# Patient Record
Sex: FEMALE | Race: WHITE | Employment: FULL TIME | URBAN - METROPOLITAN AREA
[De-identification: names, ages, dates, MRNs, and addresses within clinical notes are randomized per-mention and may not be internally consistent; named-entity substitution may affect disease eponyms.]

---

## 2019-05-08 ENCOUNTER — OFFICE VISIT (OUTPATIENT)
Dept: CARDIOLOGY CLINIC | Age: 64
End: 2019-05-08
Payer: COMMERCIAL

## 2019-05-08 VITALS
HEIGHT: 68 IN | BODY MASS INDEX: 27.25 KG/M2 | RESPIRATION RATE: 16 BRPM | SYSTOLIC BLOOD PRESSURE: 114 MMHG | WEIGHT: 179.8 LBS | DIASTOLIC BLOOD PRESSURE: 60 MMHG | HEART RATE: 81 BPM

## 2019-05-08 DIAGNOSIS — I10 HYPERTENSION, UNSPECIFIED TYPE: Primary | ICD-10-CM

## 2019-05-08 DIAGNOSIS — R07.2 PRECORDIAL PAIN: ICD-10-CM

## 2019-05-08 DIAGNOSIS — E78.00 PURE HYPERCHOLESTEROLEMIA: ICD-10-CM

## 2019-05-08 PROCEDURE — G8400 PT W/DXA NO RESULTS DOC: HCPCS | Performed by: INTERNAL MEDICINE

## 2019-05-08 PROCEDURE — 1090F PRES/ABSN URINE INCON ASSESS: CPT | Performed by: INTERNAL MEDICINE

## 2019-05-08 PROCEDURE — 4040F PNEUMOC VAC/ADMIN/RCVD: CPT | Performed by: INTERNAL MEDICINE

## 2019-05-08 PROCEDURE — G8419 CALC BMI OUT NRM PARAM NOF/U: HCPCS | Performed by: INTERNAL MEDICINE

## 2019-05-08 PROCEDURE — 93000 ELECTROCARDIOGRAM COMPLETE: CPT | Performed by: INTERNAL MEDICINE

## 2019-05-08 PROCEDURE — 3017F COLORECTAL CA SCREEN DOC REV: CPT | Performed by: INTERNAL MEDICINE

## 2019-05-08 PROCEDURE — 99204 OFFICE O/P NEW MOD 45 MIN: CPT | Performed by: INTERNAL MEDICINE

## 2019-05-08 PROCEDURE — 1123F ACP DISCUSS/DSCN MKR DOCD: CPT | Performed by: INTERNAL MEDICINE

## 2019-05-08 PROCEDURE — G8427 DOCREV CUR MEDS BY ELIG CLIN: HCPCS | Performed by: INTERNAL MEDICINE

## 2019-05-08 PROCEDURE — 1036F TOBACCO NON-USER: CPT | Performed by: INTERNAL MEDICINE

## 2019-05-08 RX ORDER — ROSUVASTATIN CALCIUM 10 MG/1
10 TABLET, COATED ORAL DAILY
COMMUNITY

## 2019-05-08 NOTE — PROGRESS NOTES
CHIEF COMPLAINT: Chest pain    HISTORY OF PRESENT ILLNESS: Patient is a 70 y.o. female seen at the request of Marivel Marroquin MD.      Patient complains of chest pain. Onset was several years ago, with worsening course since that time. The patient describes the pain as intermittent, occurring with increasing frequency, precordial and sharp in nature, radiates to the right neck/jaw and right shoulder. Patient rates pain as a 6/10 in intensity. Associated symptoms are chest pain. Aggravating factors are none. Alleviating factors are: none. Patient's cardiac risk factors are advanced age (older than 54 for men, 72 for women), dyslipidemia, family history of premature cardiovascular disease and sedentary lifestyle. Past Medical History:   Diagnosis Date    Hyperlipemia        Patient Active Problem List   Diagnosis    Pigmented skin lesions    SK (seborrheic keratosis)    Hyperlipemia       No Known Allergies    Current Outpatient Medications   Medication Sig Dispense Refill    rosuvastatin (CRESTOR) 10 MG tablet Take 10 mg by mouth daily      Naproxen Sodium (ALEVE PO) Take  by mouth as needed.       cyclobenzaprine (FLEXERIL) 10 MG tablet Take 10 mg by mouth 3 times daily as needed for Muscle spasms        Current Facility-Administered Medications   Medication Dose Route Frequency Provider Last Rate Last Dose    lidocaine-EPINEPHrine 1 %[id number] injection 20 mL  20 mL Intradermal Once Alejandra Steele MD           Social History     Socioeconomic History    Marital status:      Spouse name: Not on file    Number of children: Not on file    Years of education: Not on file    Highest education level: Not on file   Occupational History    Not on file   Social Needs    Financial resource strain: Not on file    Food insecurity:     Worry: Not on file     Inability: Not on file    Transportation needs:     Medical: Not on file     Non-medical: Not on file   Tobacco Use    Smoking status: Never Smoker    Smokeless tobacco: Never Used   Substance and Sexual Activity    Alcohol use: No    Drug use: No    Sexual activity: Not on file   Lifestyle    Physical activity:     Days per week: Not on file     Minutes per session: Not on file    Stress: Not on file   Relationships    Social connections:     Talks on phone: Not on file     Gets together: Not on file     Attends [employer] service: Not on file     Active member of club or organization: Not on file     Attends meetings of clubs or organizations: Not on file     Relationship status: Not on file    Intimate partner violence:     Fear of current or ex partner: Not on file     Emotionally abused: Not on file     Physically abused: Not on file     Forced sexual activity: Not on file   Other Topics Concern    Not on file   Social History Narrative    Not on file       Family History   Problem Relation Age of Onset    High Blood Pressure Mother     Diabetes Father     High Cholesterol Father        Review of Systems:  Heart: as above   Lungs: as above   Eyes: denies changes in vision or discharge. Ears: denies changes in hearing or pain. Nose: denies epistaxis or masses   Throat: denies sore throat or trouble swallowing. Neuro: denies numbness, tingling, tremors. Skin: denies rashes or itching. : denies hematuria, dysuria   GI: denies vomiting, diarrhea   Psych: denies mood changed, anxiety, depression. All other systems negative. Physical Exam   /60   Pulse 81   Resp 16   Ht 5' 8\" (1.727 m)   Wt 179 lb 12.8 oz (81.6 kg)   BMI 27.34 kg/m²   Constitutional: Oriented to person, place, and time. Well-developed and well-nourished. No distress. Head: Normocephalic and atraumatic. Eyes: EOM are normal. Pupils are equal, round, and reactive to light. Neck: Normal range of motion. Neck supple. No hepatojugular reflux and no JVD present. Carotid bruit is not present. No tracheal deviation present.  No thyromegaly present. Cardiovascular: Normal rate, regular rhythm, normal heart sounds and intact distal pulses. Exam reveals no gallop and no friction rub. No murmur heard. Pulmonary/Chest: Effort normal and breath sounds normal. No respiratory distress. No wheezes. No rales. No tenderness. Abdominal: Soft. Bowel sounds are normal. No distension and no mass. No tenderness. No rebound and no guarding. Musculoskeletal: Normal range of motion. No edema and no tenderness. Lymphadenopathy:   No cervical adenopathy. No groin adenopathy. Neurological: Alert and oriented to person, place, and time. Skin: Skin is warm and dry. No rash noted. Not diaphoretic. No erythema. Psychiatric: Normal mood and affect. Behavior is normal.     EKG:  normal sinus rhythm, nonspecific ST and T waves changes. ASSESSMENT AND PLAN:  Patient Active Problem List   Diagnosis    Pigmented skin lesions    SK (seborrheic keratosis)    Hyperlipemia     1. Chest pain: EKG no acute changes. Stress to evaluate. Carrie Ren D.O.   Cardiologist  Cardiology, [facility]

## 2019-05-10 ENCOUNTER — TELEPHONE (OUTPATIENT)
Dept: CARDIOLOGY CLINIC | Age: 64
End: 2019-05-10

## 2019-05-10 ENCOUNTER — HOSPITAL ENCOUNTER (OUTPATIENT)
Dept: CARDIOLOGY | Age: 64
Discharge: HOME | End: 2019-05-10
Payer: COMMERCIAL

## 2019-05-10 VITALS
BODY MASS INDEX: 27.13 KG/M2 | DIASTOLIC BLOOD PRESSURE: 70 MMHG | HEIGHT: 68 IN | SYSTOLIC BLOOD PRESSURE: 128 MMHG | WEIGHT: 179 LBS | HEART RATE: 102 BPM

## 2019-05-10 DIAGNOSIS — R07.2 PRECORDIAL PAIN: ICD-10-CM

## 2019-05-10 PROCEDURE — 2580000003 HC RX 258: Performed by: INTERNAL MEDICINE

## 2019-05-10 PROCEDURE — 78452 HT MUSCLE IMAGE SPECT MULT: CPT

## 2019-05-10 PROCEDURE — 3430000000 HC RX DIAGNOSTIC RADIOPHARMACEUTICAL: Performed by: INTERNAL MEDICINE

## 2019-05-10 PROCEDURE — A9500 TC99M SESTAMIBI: HCPCS | Performed by: INTERNAL MEDICINE

## 2019-05-10 PROCEDURE — 93017 CV STRESS TEST TRACING ONLY: CPT

## 2019-05-10 RX ORDER — SODIUM CHLORIDE 0.9 % (FLUSH) 0.9 %
10 SYRINGE (ML) INJECTION PRN
Status: DISCONTINUED | OUTPATIENT
Start: 2019-05-10 | End: 2019-05-11 | Stop reason: HOSPADM

## 2019-05-10 RX ADMIN — Medication 10.7 MILLICURIE: at 07:22

## 2019-05-10 RX ADMIN — Medication 10 ML: at 07:22

## 2019-05-10 RX ADMIN — Medication 32.8 MILLICURIE: at 08:44

## 2019-05-10 RX ADMIN — Medication 10 ML: at 08:45

## 2019-05-10 NOTE — PROCEDURES
[address] [facility] [address] [address] [address]  [phone]                Exercise Stress Nuclear Gated SPECT Study    Name: [facility] Account Number: [de-identified]    :  1955      Sex: female              Date of Study:  5/10/2019    Height: 5' 8\" (172.7 cm)  Weight: 179 lb (81.2 kg)     Ordering Provider: Luiza Sauceda DO          PCP: Paige Manuel MD      Cardiologist: Luiza Sauceda DO                        Interpreting Physician: Alona Lewis MD  _________________________________________________________________________________    Indication:   Detecting the presence and location of coronary artery disease    Clinical History:   Patient has no known history of coronary artery disease. Resting ECG:    HR 89 bpm  Normal sinus rhythm    Exercise: The patient exercised using a Stephan protocol, completing 4:56 minutes and reaching an estimated work load of 7.0 metabolic equivalents (METS). Resting HR was 89. Peak exercise heart rate was 135 ( 87% of maximum predicted heart rate for age). Baseline /70. Peak exercise /80. The blood pressure response to exercise was normal      Exercise was terminated due to heart rate attained, bilateral knee pain, patient request.    The patient experienced no chest pain with exercise. Exercise ECG:   The patient demonstrated no arrhythmias during exercise. With exercise, there were no ST segment changes of significance at the heart rate achieved. Humphrey treadmill score was 5 implying low risk. IMAGING: Myocardial perfusion imaging was performed at rest 30-35 minutes following the intravenous injection of 10.7 mCi of (Tc-Sestamibi) followed by 10 ml of Normal Saline. At peak exercise, the patient was injected intravenously with 32.8 mCi of (Tc-Sestamibi) followed by 10 ml of Normal Saline.   Gated post-stress tomographic imaging was performed - minutes after stress. FINDINGS: The overall quality of the study was excellent. Left ventricular cavity size was noted to be normal.    Rotational analog analysis demonstrated no patient motion. The gated SPECT stress imaging in the short, vertical long, and horizontal long axis demonstrated normal homogeneous tracer distribution throughout the myocardium. The resting images show no change. Gated SPECT left ventricular ejection fraction was calculated to be 94%, with normal myocardial thickening and wall motion. Impression:    1. Exercise EKG was negative. 2. The patient experienced no chest pain with exercise. 3. The myocardial perfusion imaging was normal.  4. Overall left ventricular systolic function was normal without regional wall motion abnormalities. 5. Humphrey treadmill score was 5 implying low risk. 6. Exercise capacity was average. 7. Low risk general exercise treadmill test.    Thank you for sending your patient to this [employer].      Electronically signed by Rell Camacho MD on 5/10/19 at 1:39 PM

## 2024-03-30 ENCOUNTER — HOSPITAL ENCOUNTER (INPATIENT)
Age: 69
LOS: 1 days | Discharge: HOME | DRG: 057 | End: 2024-04-01
Attending: EMERGENCY MEDICINE | Admitting: STUDENT IN AN ORGANIZED HEALTH CARE EDUCATION/TRAINING PROGRAM
Payer: MEDICARE

## 2024-03-30 ENCOUNTER — APPOINTMENT (OUTPATIENT)
Dept: GENERAL RADIOLOGY | Age: 69
DRG: 057 | End: 2024-03-30
Payer: MEDICARE

## 2024-03-30 ENCOUNTER — APPOINTMENT (OUTPATIENT)
Dept: CT IMAGING | Age: 69
DRG: 057 | End: 2024-03-30
Payer: MEDICARE

## 2024-03-30 DIAGNOSIS — G91.2 NORMAL PRESSURE HYDROCEPHALUS (HCC): Primary | ICD-10-CM

## 2024-03-30 LAB
ALBUMIN SERPL-MCNC: 4.4 G/DL (ref 3.5–5.2)
ALP SERPL-CCNC: 95 U/L (ref 35–104)
ALT SERPL-CCNC: 21 U/L (ref 0–32)
ANION GAP SERPL CALCULATED.3IONS-SCNC: 13 MMOL/L (ref 7–16)
AST SERPL-CCNC: 19 U/L (ref 0–31)
BASOPHILS # BLD: 0.08 K/UL (ref 0–0.2)
BASOPHILS NFR BLD: 1 % (ref 0–2)
BILIRUB SERPL-MCNC: 0.3 MG/DL (ref 0–1.2)
BUN SERPL-MCNC: 15 MG/DL (ref 6–23)
CALCIUM SERPL-MCNC: 9.5 MG/DL (ref 8.6–10.2)
CHLORIDE SERPL-SCNC: 102 MMOL/L (ref 98–107)
CO2 SERPL-SCNC: 25 MMOL/L (ref 22–29)
CREAT SERPL-MCNC: 0.6 MG/DL (ref 0.5–1)
EOSINOPHIL # BLD: 0.25 K/UL (ref 0.05–0.5)
EOSINOPHILS RELATIVE PERCENT: 3 % (ref 0–6)
ERYTHROCYTE [DISTWIDTH] IN BLOOD BY AUTOMATED COUNT: 13.2 % (ref 11.5–15)
GFR SERPL CREATININE-BSD FRML MDRD: >90 ML/MIN/1.73M2
GLUCOSE BLD-MCNC: 122 MG/DL (ref 74–99)
GLUCOSE SERPL-MCNC: 145 MG/DL (ref 74–99)
HCT VFR BLD AUTO: 45.2 % (ref 34–48)
HGB BLD-MCNC: 15.1 G/DL (ref 11.5–15.5)
IMM GRANULOCYTES # BLD AUTO: 0.09 K/UL (ref 0–0.58)
IMM GRANULOCYTES NFR BLD: 1 % (ref 0–5)
LYMPHOCYTES NFR BLD: 3.81 K/UL (ref 1.5–4)
LYMPHOCYTES RELATIVE PERCENT: 38 % (ref 20–42)
MCH RBC QN AUTO: 30.7 PG (ref 26–35)
MCHC RBC AUTO-ENTMCNC: 33.4 G/DL (ref 32–34.5)
MCV RBC AUTO: 91.9 FL (ref 80–99.9)
MONOCYTES NFR BLD: 0.47 K/UL (ref 0.1–0.95)
MONOCYTES NFR BLD: 5 % (ref 2–12)
NEUTROPHILS NFR BLD: 53 % (ref 43–80)
NEUTS SEG NFR BLD: 5.34 K/UL (ref 1.8–7.3)
PLATELET # BLD AUTO: 245 K/UL (ref 130–450)
PMV BLD AUTO: 10.4 FL (ref 7–12)
POTASSIUM SERPL-SCNC: 3.6 MMOL/L (ref 3.5–5)
PROT SERPL-MCNC: 7.8 G/DL (ref 6.4–8.3)
RBC # BLD AUTO: 4.92 M/UL (ref 3.5–5.5)
SODIUM SERPL-SCNC: 140 MMOL/L (ref 132–146)
TROPONIN I SERPL HS-MCNC: 7 NG/L (ref 0–9)
WBC OTHER # BLD: 10 K/UL (ref 4.5–11.5)

## 2024-03-30 PROCEDURE — 72125 CT NECK SPINE W/O DYE: CPT

## 2024-03-30 PROCEDURE — 93005 ELECTROCARDIOGRAM TRACING: CPT

## 2024-03-30 PROCEDURE — 99285 EMERGENCY DEPT VISIT HI MDM: CPT

## 2024-03-30 PROCEDURE — 96374 THER/PROPH/DIAG INJ IV PUSH: CPT

## 2024-03-30 PROCEDURE — 70450 CT HEAD/BRAIN W/O DYE: CPT

## 2024-03-30 PROCEDURE — 84484 ASSAY OF TROPONIN QUANT: CPT

## 2024-03-30 PROCEDURE — 80053 COMPREHEN METABOLIC PANEL: CPT

## 2024-03-30 PROCEDURE — 71045 X-RAY EXAM CHEST 1 VIEW: CPT

## 2024-03-30 PROCEDURE — 85025 COMPLETE CBC W/AUTO DIFF WBC: CPT

## 2024-03-30 PROCEDURE — 6360000002 HC RX W HCPCS: Performed by: EMERGENCY MEDICINE

## 2024-03-30 PROCEDURE — 82962 GLUCOSE BLOOD TEST: CPT

## 2024-03-30 RX ORDER — ONDANSETRON 2 MG/ML
4 INJECTION INTRAMUSCULAR; INTRAVENOUS ONCE
Status: COMPLETED | OUTPATIENT
Start: 2024-03-30 | End: 2024-03-30

## 2024-03-30 RX ADMIN — ONDANSETRON HYDROCHLORIDE 4 MG: 2 INJECTION, SOLUTION INTRAMUSCULAR; INTRAVENOUS at 19:52

## 2024-03-30 ASSESSMENT — PAIN - FUNCTIONAL ASSESSMENT: PAIN_FUNCTIONAL_ASSESSMENT: NONE - DENIES PAIN

## 2024-03-30 ASSESSMENT — LIFESTYLE VARIABLES
HOW OFTEN DO YOU HAVE A DRINK CONTAINING ALCOHOL: NEVER
HOW OFTEN DO YOU HAVE A DRINK CONTAINING ALCOHOL: NEVER
HOW MANY STANDARD DRINKS CONTAINING ALCOHOL DO YOU HAVE ON A TYPICAL DAY: PATIENT DOES NOT DRINK

## 2024-03-31 PROBLEM — G91.2 NORMAL PRESSURE HYDROCEPHALUS (HCC): Status: ACTIVE | Noted: 2024-03-31

## 2024-03-31 LAB
25(OH)D3 SERPL-MCNC: 16.5 NG/ML (ref 30–100)
ALBUMIN SERPL-MCNC: 4.2 G/DL (ref 3.5–5.2)
ALP SERPL-CCNC: 81 U/L (ref 35–104)
ALT SERPL-CCNC: 19 U/L (ref 0–32)
AMMONIA PLAS-SCNC: 11 UMOL/L (ref 11–51)
AMPHET UR QL SCN: NEGATIVE
ANION GAP SERPL CALCULATED.3IONS-SCNC: 16 MMOL/L (ref 7–16)
AST SERPL-CCNC: 18 U/L (ref 0–31)
B PARAP IS1001 DNA NPH QL NAA+NON-PROBE: NOT DETECTED
B PERT DNA SPEC QL NAA+PROBE: NOT DETECTED
BARBITURATES UR QL SCN: NEGATIVE
BENZODIAZ UR QL: NEGATIVE
BILIRUB SERPL-MCNC: 0.4 MG/DL (ref 0–1.2)
BUN SERPL-MCNC: 14 MG/DL (ref 6–23)
BUPRENORPHINE UR QL: NEGATIVE
C PNEUM DNA NPH QL NAA+NON-PROBE: NOT DETECTED
CALCIUM SERPL-MCNC: 9.6 MG/DL (ref 8.6–10.2)
CANNABINOIDS UR QL SCN: NEGATIVE
CHLORIDE SERPL-SCNC: 102 MMOL/L (ref 98–107)
CO2 SERPL-SCNC: 24 MMOL/L (ref 22–29)
COCAINE UR QL SCN: NEGATIVE
CREAT SERPL-MCNC: 0.6 MG/DL (ref 0.5–1)
CRP SERPL HS-MCNC: <3 MG/L (ref 0–5)
ERYTHROCYTE [SEDIMENTATION RATE] IN BLOOD BY WESTERGREN METHOD: 23 MM/HR (ref 0–20)
FENTANYL UR QL: NEGATIVE
FLUAV RNA NPH QL NAA+NON-PROBE: NOT DETECTED
FLUBV RNA NPH QL NAA+NON-PROBE: NOT DETECTED
FOLATE SERPL-MCNC: 8.9 NG/ML (ref 4.8–24.2)
GFR SERPL CREATININE-BSD FRML MDRD: >90 ML/MIN/1.73M2
GLUCOSE SERPL-MCNC: 155 MG/DL (ref 74–99)
HADV DNA NPH QL NAA+NON-PROBE: NOT DETECTED
HCOV 229E RNA NPH QL NAA+NON-PROBE: NOT DETECTED
HCOV HKU1 RNA NPH QL NAA+NON-PROBE: NOT DETECTED
HCOV NL63 RNA NPH QL NAA+NON-PROBE: NOT DETECTED
HCOV OC43 RNA NPH QL NAA+NON-PROBE: NOT DETECTED
HMPV RNA NPH QL NAA+NON-PROBE: NOT DETECTED
HPIV1 RNA NPH QL NAA+NON-PROBE: NOT DETECTED
HPIV2 RNA NPH QL NAA+NON-PROBE: NOT DETECTED
HPIV3 RNA NPH QL NAA+NON-PROBE: NOT DETECTED
HPIV4 RNA NPH QL NAA+NON-PROBE: NOT DETECTED
M PNEUMO DNA NPH QL NAA+NON-PROBE: NOT DETECTED
METHADONE UR QL: NEGATIVE
OPIATES UR QL SCN: NEGATIVE
OXYCODONE UR QL SCN: NEGATIVE
PCP UR QL SCN: NEGATIVE
POTASSIUM SERPL-SCNC: 3.8 MMOL/L (ref 3.5–5)
PROT SERPL-MCNC: 7.3 G/DL (ref 6.4–8.3)
RSV RNA NPH QL NAA+NON-PROBE: NOT DETECTED
RV+EV RNA NPH QL NAA+NON-PROBE: NOT DETECTED
SARS-COV-2 RNA NPH QL NAA+NON-PROBE: NOT DETECTED
SODIUM SERPL-SCNC: 142 MMOL/L (ref 132–146)
SPECIMEN DESCRIPTION: NORMAL
TEST INFORMATION: NORMAL
TSH SERPL DL<=0.05 MIU/L-ACNC: 2.54 UIU/ML (ref 0.27–4.2)
VIT B12 SERPL-MCNC: 596 PG/ML (ref 211–946)

## 2024-03-31 PROCEDURE — 80307 DRUG TEST PRSMV CHEM ANLYZR: CPT

## 2024-03-31 PROCEDURE — 82607 VITAMIN B-12: CPT

## 2024-03-31 PROCEDURE — 0202U NFCT DS 22 TRGT SARS-COV-2: CPT

## 2024-03-31 PROCEDURE — 85652 RBC SED RATE AUTOMATED: CPT

## 2024-03-31 PROCEDURE — 6370000000 HC RX 637 (ALT 250 FOR IP): Performed by: NURSE PRACTITIONER

## 2024-03-31 PROCEDURE — 82746 ASSAY OF FOLIC ACID SERUM: CPT

## 2024-03-31 PROCEDURE — 80053 COMPREHEN METABOLIC PANEL: CPT

## 2024-03-31 PROCEDURE — 82140 ASSAY OF AMMONIA: CPT

## 2024-03-31 PROCEDURE — 99222 1ST HOSP IP/OBS MODERATE 55: CPT | Performed by: PSYCHIATRY & NEUROLOGY

## 2024-03-31 PROCEDURE — 84443 ASSAY THYROID STIM HORMONE: CPT

## 2024-03-31 PROCEDURE — 2580000003 HC RX 258: Performed by: NURSE PRACTITIONER

## 2024-03-31 PROCEDURE — 86140 C-REACTIVE PROTEIN: CPT

## 2024-03-31 PROCEDURE — 1200000000 HC SEMI PRIVATE

## 2024-03-31 PROCEDURE — 36415 COLL VENOUS BLD VENIPUNCTURE: CPT

## 2024-03-31 PROCEDURE — 2580000003 HC RX 258: Performed by: STUDENT IN AN ORGANIZED HEALTH CARE EDUCATION/TRAINING PROGRAM

## 2024-03-31 PROCEDURE — 82306 VITAMIN D 25 HYDROXY: CPT

## 2024-03-31 RX ORDER — SODIUM CHLORIDE 9 MG/ML
INJECTION, SOLUTION INTRAVENOUS PRN
Status: DISCONTINUED | OUTPATIENT
Start: 2024-03-31 | End: 2024-04-01 | Stop reason: HOSPADM

## 2024-03-31 RX ORDER — ENOXAPARIN SODIUM 100 MG/ML
40 INJECTION SUBCUTANEOUS DAILY
Status: DISCONTINUED | OUTPATIENT
Start: 2024-03-31 | End: 2024-04-01 | Stop reason: HOSPADM

## 2024-03-31 RX ORDER — ROSUVASTATIN CALCIUM 10 MG/1
10 TABLET, COATED ORAL DAILY
Status: DISCONTINUED | OUTPATIENT
Start: 2024-03-31 | End: 2024-04-01 | Stop reason: HOSPADM

## 2024-03-31 RX ORDER — ONDANSETRON 2 MG/ML
4 INJECTION INTRAMUSCULAR; INTRAVENOUS EVERY 6 HOURS PRN
Status: DISCONTINUED | OUTPATIENT
Start: 2024-03-31 | End: 2024-04-01 | Stop reason: HOSPADM

## 2024-03-31 RX ORDER — POTASSIUM CHLORIDE 20 MEQ/1
40 TABLET, EXTENDED RELEASE ORAL PRN
Status: DISCONTINUED | OUTPATIENT
Start: 2024-03-31 | End: 2024-04-01 | Stop reason: HOSPADM

## 2024-03-31 RX ORDER — ACETAMINOPHEN 325 MG/1
650 TABLET ORAL EVERY 6 HOURS PRN
Status: DISCONTINUED | OUTPATIENT
Start: 2024-03-31 | End: 2024-04-01 | Stop reason: HOSPADM

## 2024-03-31 RX ORDER — SODIUM CHLORIDE 0.9 % (FLUSH) 0.9 %
10 SYRINGE (ML) INJECTION PRN
Status: DISCONTINUED | OUTPATIENT
Start: 2024-03-31 | End: 2024-04-01 | Stop reason: HOSPADM

## 2024-03-31 RX ORDER — SODIUM CHLORIDE, SODIUM LACTATE, POTASSIUM CHLORIDE, CALCIUM CHLORIDE 600; 310; 30; 20 MG/100ML; MG/100ML; MG/100ML; MG/100ML
INJECTION, SOLUTION INTRAVENOUS CONTINUOUS
Status: ACTIVE | OUTPATIENT
Start: 2024-03-31 | End: 2024-04-01

## 2024-03-31 RX ORDER — ONDANSETRON 4 MG/1
4 TABLET, ORALLY DISINTEGRATING ORAL EVERY 8 HOURS PRN
Status: DISCONTINUED | OUTPATIENT
Start: 2024-03-31 | End: 2024-04-01 | Stop reason: HOSPADM

## 2024-03-31 RX ORDER — MAGNESIUM SULFATE IN WATER 40 MG/ML
2000 INJECTION, SOLUTION INTRAVENOUS PRN
Status: DISCONTINUED | OUTPATIENT
Start: 2024-03-31 | End: 2024-04-01 | Stop reason: HOSPADM

## 2024-03-31 RX ORDER — POTASSIUM CHLORIDE 7.45 MG/ML
10 INJECTION INTRAVENOUS PRN
Status: DISCONTINUED | OUTPATIENT
Start: 2024-03-31 | End: 2024-04-01 | Stop reason: HOSPADM

## 2024-03-31 RX ORDER — CYCLOBENZAPRINE HCL 10 MG
10 TABLET ORAL PRN
Status: DISCONTINUED | OUTPATIENT
Start: 2024-03-31 | End: 2024-04-01 | Stop reason: HOSPADM

## 2024-03-31 RX ORDER — SENNOSIDES A AND B 8.6 MG/1
1 TABLET, FILM COATED ORAL DAILY PRN
Status: DISCONTINUED | OUTPATIENT
Start: 2024-03-31 | End: 2024-04-01 | Stop reason: HOSPADM

## 2024-03-31 RX ORDER — ACETAMINOPHEN 650 MG/1
650 SUPPOSITORY RECTAL EVERY 6 HOURS PRN
Status: DISCONTINUED | OUTPATIENT
Start: 2024-03-31 | End: 2024-04-01 | Stop reason: HOSPADM

## 2024-03-31 RX ORDER — SODIUM CHLORIDE 0.9 % (FLUSH) 0.9 %
10 SYRINGE (ML) INJECTION EVERY 12 HOURS SCHEDULED
Status: DISCONTINUED | OUTPATIENT
Start: 2024-03-31 | End: 2024-04-01 | Stop reason: HOSPADM

## 2024-03-31 RX ADMIN — SODIUM CHLORIDE, POTASSIUM CHLORIDE, SODIUM LACTATE AND CALCIUM CHLORIDE: 600; 310; 30; 20 INJECTION, SOLUTION INTRAVENOUS at 09:59

## 2024-03-31 RX ADMIN — SODIUM CHLORIDE, PRESERVATIVE FREE 10 ML: 5 INJECTION INTRAVENOUS at 21:07

## 2024-03-31 RX ADMIN — ACETAMINOPHEN 650 MG: 325 TABLET ORAL at 18:31

## 2024-03-31 RX ADMIN — ROSUVASTATIN 10 MG: 10 TABLET, FILM COATED ORAL at 08:37

## 2024-03-31 RX ADMIN — SODIUM CHLORIDE, PRESERVATIVE FREE 10 ML: 5 INJECTION INTRAVENOUS at 08:36

## 2024-03-31 ASSESSMENT — PAIN DESCRIPTION - LOCATION: LOCATION: HEAD;NECK

## 2024-03-31 ASSESSMENT — PAIN SCALES - GENERAL: PAINLEVEL_OUTOF10: 5

## 2024-03-31 ASSESSMENT — PAIN DESCRIPTION - DESCRIPTORS: DESCRIPTORS: ACHING

## 2024-03-31 ASSESSMENT — PAIN DESCRIPTION - ORIENTATION: ORIENTATION: MID

## 2024-03-31 NOTE — PROGRESS NOTES
4 Eyes Skin Assessment     NAME:  Emili Esquivel  YOB: 1955  MEDICAL RECORD NUMBER:  [id number]    The patient is being assessed for  Admission    I agree that at least one RN has performed a thorough Head to Toe Skin Assessment on the patient. ALL assessment sites listed below have been assessed.      Areas assessed by both nurses:    Head, Face, Ears, Shoulders, Back, Chest, Arms, Elbows, Hands, Sacrum. Buttock, Coccyx, Ischium, and Legs. Feet and Heels        Does the Patient have a Wound? No noted wound(s)       Guillermo Prevention initiated by RN: No  Wound Care Orders initiated by RN: No    Pressure Injury (Stage 3,4, Unstageable, DTI, NWPT, and Complex wounds) if present, place Wound referral order by RN under : No    New Ostomies, if present place, Ostomy referral order under : No     Nurse 1 eSignature: Electronically signed by Juana Roberson RN on 3/31/24 at 6:15 AM EDT    **SHARE this note so that the co-signing nurse can place an eSignature**    Nurse 2 eSignature: {Esignature:[id number]}

## 2024-03-31 NOTE — ED PROVIDER NOTES
ordered.  ECG/medicine tests: ordered.    Risk  Prescription drug management.  Decision regarding hospitalization.        Patient is a 68-year-old female presenting with vomiting at the time initial evaluation, the patient is hemodynamically stable.  Differential diagnosis includes acute coronary syndrome, hypoglycemia, intracranial hemorrhage, pneumonia, pneumothorax to name a few.    Patient was medicated with Zofran in the ER.    EKG ordered to evaluate patient's current cardiac rate, rhythm, and QT interval. POCT glucose ordered to rule out acute hyperglycemia or hypoglycemia. CBC is ordered to evaluate for any signs of infection or inflammation by obtaining a WBC count, or any signs of acute anemia by interpreting hemoglobin. CMP for any electrolyte imbalances, kidney function, hepatic injury or any elevations in anion gap. Chest x-ray for any possible signs of, but without limitation to, pneumonia, pleural effusions, cardiomegaly, pneumothorax, atelectasis, rib or sternal abnormalities including fractures. CT head and cervical spine for hemorrhages, fractures, subluxation or displacement.      Point-of-care glucose is 122.  CBC does not suggest acute pathology.  Troponin is initially stable.  CMP shows glucose of 145 with otherwise all normal electrolytes, kidney function and liver function.  EKG does not suggest acute skin pathology.  CT head shows evidence of normal pressure hydrocephalus.  CT cervical spine is absent of acute pathology.  Chest x-ray shows no acute pathology.    On reevaluation, patient notes improvement of her nausea but states that her symptoms returned when she attempts to sit up or ambulate.  She denies any room spinning or vertiginous symptoms.  Her neuroexam continues to remain benign.  Patient does report that she had an outpatient CT scan which did show evidence of normal pressure hydrocephalus and that her PCP recommended that she follow-up with a neurosurgeon which she has not had

## 2024-03-31 NOTE — PLAN OF CARE
Problem: Safety - Adult  Goal: Free from fall injury  3/31/2024 1036 by Carolee Mohamud, RN  Outcome: Progressing

## 2024-03-31 NOTE — H&P
Internal Medicine History & Physical     Name: Emili Esquivel  : 1955  Chief Complaint: Emesis (Per pt feeling nausea since this am.  1 episode of yellow emesis while in ER with family.  PT states \"I just don't feel well.\")  Primary Care Physician: Pa Burdick MD  Admission date: 3/30/2024  Date of service: 3/31/2024     History of Present Illness  Emili is a 68 y.o. year old female presented with a chief complaint of emesis      Took her  to the ED down stairs last night, she states she was feeling nauseas, fell or possibly sat on the floor but did not have LOC or dizziness     1 week ago fell and hit her head on a truck after coming home from previous work a banquet her yard is on a hill she started walking up and lost her balance it was slippery and muddy and she fell back against the truck     Prior to this event she was walking well without issues     Since this event she has been feeling off balance walking at all times, getting up quick makes it worse, nothing makes it better.     She has been having urinary incontinence for 1 year     She has been having worsening confusion she thinks     Denies fevers chills or sweats    Denies alcohol drugs or cigarettes     She is retired     Past Medical History:   Diagnosis Date    Arthritis     Cancer (HCC)     skin     Hyperlipemia        Past Surgical History:   Procedure Laterality Date    APPENDECTOMY      COLONOSCOPY      HYSTERECTOMY         Family History   Problem Relation Age of Onset    High Blood Pressure Mother     Heart Attack Mother         age 70's    Diabetes Father     High Cholesterol Father     Other Father         peripheral artery disease          Social History  Patient lives at home.   At baseline patient ambulates with out assistance   Illicit drugs: Denies   TOBACCO:   reports that she has never smoked. She has never used smokeless tobacco.  ETOH:   reports no history of alcohol use.    Home Medications  Prior to

## 2024-03-31 NOTE — CARE COORDINATION
Internal Medicine On-call Care Coordination Note    I was called by the ED physician because they recommended admission for this patient and we cover their PCP.  The history as I understand it after discussion with the ED physician is as follows:    Presents with nausea and vomiting  Recent fall into truck that left a dent  Imaging suspicious for normal pressure hydrocephalus, moderate white matter changes    I placed admission orders.  Including:    Neurology consult  Clear liquid diet    Dr. Randle, or our coverage will see the patient for H&P.    Electronically signed by FAISAL Rodriguez CNP on 3/31/2024 at 5:14 AM

## 2024-03-31 NOTE — ED NOTES
Patient ambulated independently to restroom and back to room without assistance. Gait was quick and steady with no seeming problems with motion.

## 2024-04-01 VITALS
TEMPERATURE: 97.5 F | WEIGHT: 165 LBS | HEIGHT: 68 IN | SYSTOLIC BLOOD PRESSURE: 120 MMHG | HEART RATE: 89 BPM | RESPIRATION RATE: 16 BRPM | OXYGEN SATURATION: 98 % | BODY MASS INDEX: 25.01 KG/M2 | DIASTOLIC BLOOD PRESSURE: 70 MMHG

## 2024-04-01 PROCEDURE — 2580000003 HC RX 258: Performed by: NURSE PRACTITIONER

## 2024-04-01 PROCEDURE — 99222 1ST HOSP IP/OBS MODERATE 55: CPT | Performed by: NEUROLOGICAL SURGERY

## 2024-04-01 RX ADMIN — SODIUM CHLORIDE, PRESERVATIVE FREE 10 ML: 5 INJECTION INTRAVENOUS at 08:31

## 2024-04-01 NOTE — CONSULTS
NEUROLOGY CONSULT NOTE      Requesting Physician:  Aicha Gaspar APRN - CNP    Reason for Consult:  Evaluate for normal pressure hydrocephalus.    History of Present Illness:  Emili Esquivel is a 68 y.o. female  with h/o HLD, skin cancer, urinary incontinence x 1 year and arthritis  who was admitted to [facility] on 3/30/2024 with presentation of vomiting.  She was visiting her  in the hospital and presented to the ED because of nausea and vomiting.  Earlier, she apparently had a syncopal episode and fell into the side of her truck hitting her head and causing a significant dent in the truck.  She had no recall of the event and had to be helped into her home by her .  Patient's next recall was sitting in her house after regaining awareness.  No report of any specific precipitating event or factors.  She did report having problems with intermittent confusion that she feels is getting worse.  No history of similar events in the past.  There is no report of any associated neurologic deficits or prior history of transient neurologic deficits.  Patient did have a CT scan of the brain done 3/22/2024 that did not show any acute intracranial abnormalities.  There was note of a moderate disproportionate prominence of the ventricular system that was read as typical for communicating hydrocephalus/normal-pressure hydrocephalus.  NIHSS was 0 on evaluation in the ED with labs that were unremarkable.  A CT scan of the head was done on ED evaluation showing moderate ventriculomegaly suspicious for NPH along with moderate periventricular white matter changes and global parenchymal volume loss.  CT of the cervical spine did not show any acute intracranial abnormalities.  Patient continued to have nausea in ED with notes significant worsening whenever she attempted to sit up or ambulate.  There was no report of any associated vertigo.  No report of any focality on neurologic exam in the ED. States that she

## 2024-04-01 NOTE — CARE COORDINATION
Social Work/Case Management Transition of Care Planning (Vaishnavi Lam -[phone]):  Patient discharged prior to being seen by SW.  Per nursing, patient will follow up as an outpatient.  Therapy screened the patient and indicated patient is independent with all aspects.  Patient reportedly drove herself home.  Vaishnavi Lam, CHAYA  4/1/2024

## 2024-04-01 NOTE — CONSULTS
[facility]              [address] [place] [address]                              CONSULTATION      PATIENT NAME: HARIS VERGARA                 : 1955  MED REC NO: [id number]                        ROOM: [id number]  ACCOUNT NO: [id number]                       ADMIT DATE: 2024  PROVIDER: Maximus Maldonado MD    NEUROSURGERY CONSULT    CONSULT DATE:  2024      REASON FOR CONSULT:    1. Possible normal-pressure hydrocephalus.    2. Ventriculomegaly.    HISTORY OF PRESENT ILLNESS:  The patient is a 68-year-old lady who presents to the hospital with her , he was actually in the emergency room.  However, while she was there she developed nausea and vomiting, and she states that she does get this as a result of motion sickness.  She was subsequently admitted for workup of her nausea and vomiting, and ultimately it was reported that a few days ago she did hit her head and she ultimately had a CT scan of her head that did show ventriculomegaly for which Neurosurgery Service is consulted.  On further questioning, she does admit to some occasional gait instability and some occasional urinary incontinence.    PAST MEDICAL HISTORY:  Positive for arthritis, skin cancer, and hyperlipidemia.    PAST SURGICAL HISTORY:  Positive for appendectomy, colonoscopy, hysterectomy, carpal tunnel release.    FAMILY HISTORY:  Positive for diabetes and high cholesterol in her father.    SOCIAL HISTORY:  Negative for tobacco or alcohol use.    ALLERGIES:  SHE HAS NO KNOWN DRUG ALLERGIES.      REVIEW OF SYSTEMS:  HEENT:  Positive for headache.  Negative for double vision, blurry vision.  CARDIOVASCULAR:  Negative for chest pain, arrhythmia, or palpitations.  RESPIRATORY:  Negative for shortness of breath, asthma, bronchitis, or pneumonia.  GASTROINTESTINAL:  Positive for nausea and vomiting.  GENITOURINARY:  Negative for hematuria or dysuria.  HEMATOLOGIC:  Negative for easy  bleeding or bruising.  INFECTIOUS:  Negative for any recent infection.  MUSCULOSKELETAL:  Positive for joint stiffness or swelling.  PSYCHIATRIC:  Negative for anxiety or depression.  NEUROLOGIC:  Negative for seizure or stroke.  ENDOCRINE:  Negative for thyroid disorder or diabetes.    PHYSICAL EXAMINATION:  VITAL SIGNS:  She is currently afebrile with a T-current of 36.5 degrees Celsius, pulse 87, blood pressure 105/62, respiratory rate is 16.  GENERAL:  She is resting in bed.  Does not appear to be in any acute distress, appears her stated age.  HEENT:  Head is normocephalic and atraumatic.  Pupils are 3-2 mm and reactive.  She has no drainage out of eyes, ears, nose, or throat.  SKIN:  Warm and dry.  MUSCULOSKELETAL:  Good range of motion of bilateral upper and lower extremities.  ABDOMEN:  Soft, nontender, nondistended.  RESPIRATORY:  She is not using any accessory muscle for respiration.  NEUROLOGIC:  On the rest of her neurologic exam, she is awake, alert, and oriented x3.  Cranial nerves 2 through 12 are intact bilaterally.  Motor exam reveals 5/5 strength in her bilateral upper and lower extremities.  Sensation is grossly intact to light touch.  Reflexes are 2+ and symmetric.  Toes are going down.    LABORATORY DATA:  Sodium 142, potassium 3.8, BUN 14, creatinine 0.6.    IMAGING:  On review of imaging, CT scan of her head shows ventriculomegaly.    ASSESSMENT AND PLAN:  The patient is a 68-year-old lady with ventriculomegaly.  She is neurologically stable.  It is unclear as to whether or not she has true symptoms of normal-pressure hydrocephalus.  From a neurosurgical standpoint, she can be discharged and she can follow up in the office to discuss lumbar drain trial.          FLACA JACOBO MD      D:  04/01/2024 06:24:51     T:  04/01/2024 06:47:42     DANIEL/LB  Job #:  [id number]     Doc#:  [id number]

## 2024-04-01 NOTE — DISCHARGE SUMMARY
Internal Medicine Discharge Summary    NAME: Emili Esquivel :  1955  MRN:  [id number] PCP:Pa Burdick MD    ADMITTED: 3/30/2024   DISCHARGED: 2024 10:52 AM    ADMITTING PHYSICIAN: No att. providers found    PCP: Pa Burdick MD    CONSULTANT(S):   IP CONSULT TO INTERNAL MEDICINE  IP CONSULT TO NEUROLOGY  IP CONSULT TO NEUROSURGERY     ADMITTING DIAGNOSIS:   Normal pressure hydrocephalus (HCC) [G91.2]     Please see H&P for further details    DISCHARGE DIAGNOSES:   Active Hospital Problems    Diagnosis     Normal pressure hydrocephalus (HCC) [G91.2]     Hyperlipemia [E78.5]        BRIEF HISTORY OF PRESENT ILLNESS: Emili Esquivel is a 68 y.o. female patient of Pa Burdick MD who  has a past medical history of Arthritis, Cancer (HCC), and Hyperlipemia. who originally had concerns including Emesis (Per pt feeling nausea since this am.  1 episode of yellow emesis while in ER with family.  PT states \"I just don't feel well.\"). at presentation on 3/30/2024, and was found to have Normal pressure hydrocephalus (HCC) [G91.2] after workup.    Please see H&P for further details.    HOSPITAL COURSE:   The patient presented to the hospital with the chief complaint of Emesis (Per pt feeling nausea since this am.  1 episode of yellow emesis while in ER with family.  PT states \"I just don't feel well.\")  . The patient was admitted to the hospital.     Falls  Unsteady gait   Urinary incontinence   Vomiting   Abnormal CT head showing moderate ventriculomegaly and moderate periventricular white matter changes with global parenchymal volume loss      Gentle fluids   Ammonia B12 Folate Vitamin D TSH  UDS  Viral PCR   MRI brain   Neurology consult   Neurosurgery consult      PT/OT  Home medications to be reconciled and confirmed prior to being ordered  DVT prophylaxis   Code Status Full   Discharge plan: her  is in ICU with TBI. She would like to go home and follow up with neuro surgery for    The patient is being discharged to home    DISCHARGE MEDICATIONS:      Medication List        CONTINUE taking these medications      rosuvastatin 10 MG tablet  Commonly known as: CRESTOR            STOP taking these medications      ALEVE PO     cyclobenzaprine 10 MG tablet  Commonly known as: FLEXERIL     diclofenac sodium 1 % Gel  Commonly known as: VOLTAREN              Discharge Medication List as of 4/1/2024 10:50 AM        Discharge Medication List as of 4/1/2024 10:50 AM        STOP taking these medications       diclofenac sodium 1 % GEL Comments:   Reason for Stopping:         Naproxen Sodium (ALEVE PO) Comments:   Reason for Stopping:         cyclobenzaprine (FLEXERIL) 10 MG tablet Comments:   Reason for Stopping:             Discharge Medication List as of 4/1/2024 10:50 AM          INTERNAL MEDICINE FOLLOW UP/INSTRUCTIONS:  Follow-up with primary care physician within 1 week of discharge from hospital  Please review changes to pre-hospital admission medications and prescriptions for new medications upon discharge from the hospital with PCP  Please review results of labs and imaging studies with PCP  Follow-up with consultants as directed by them   If recurrence or worsening of symptoms please call primary care physician or return to the ER immediately  Diet: No diet orders on file    Preparing for this patient's discharge, including paperwork, orders, instructions, and meeting with patient did required >35 minutes.    Electronically signed by Pa Randle MD on 4/1/2024 at 3:29 PM

## 2024-04-01 NOTE — PROGRESS NOTES
OCCUPATIONAL THERAPY   [facility] [address], [place]     Date:2024  Patient Name: Emili Esquivel  MRN: [id number]  : 1955  Room: 84/8416-     Occupational Therapy Screen     Occupational therapy orders received/chart review completed. Pt is independent with all ADLs and functional mobility. No skilled OT needs at this time. Will discontinue OT services at this time. Please re-consult if status changes. Pt verbalized understanding and agreement.       Dakota Peter OTR/L [id number]

## 2024-04-01 NOTE — PROGRESS NOTES
Spoke to Idalia Luna NP regarding discharge vs. If patient need LP---Per NP patient is okay to discharge from neurology standpoint and follow up with Dr. Maldonado.  Dr. Randle notified.

## 2024-04-02 LAB
EKG ATRIAL RATE: 77 BPM
EKG P AXIS: 49 DEGREES
EKG P-R INTERVAL: 214 MS
EKG Q-T INTERVAL: 400 MS
EKG QRS DURATION: 78 MS
EKG QTC CALCULATION (BAZETT): 452 MS
EKG R AXIS: 9 DEGREES
EKG T AXIS: 35 DEGREES
EKG VENTRICULAR RATE: 77 BPM

## 2024-04-18 ENCOUNTER — OFFICE VISIT (OUTPATIENT)
Dept: NEUROSURGERY | Age: 69
End: 2024-04-18
Payer: MEDICARE

## 2024-04-18 DIAGNOSIS — G91.2 NPH (NORMAL PRESSURE HYDROCEPHALUS) (HCC): Primary | ICD-10-CM

## 2024-04-18 PROCEDURE — 99213 OFFICE O/P EST LOW 20 MIN: CPT | Performed by: NEUROLOGICAL SURGERY

## 2024-04-18 PROCEDURE — 1123F ACP DISCUSS/DSCN MKR DOCD: CPT | Performed by: NEUROLOGICAL SURGERY

## 2024-04-18 RX ORDER — MECLIZINE HYDROCHLORIDE 25 MG/1
25 TABLET ORAL 3 TIMES DAILY PRN
COMMUNITY
Start: 2024-03-13

## 2024-04-18 NOTE — PROGRESS NOTES
Patient is here for follow up from a hospital consult for: normal pressure hydrocephalus.  She has gait instability, urinary incontinence and memory problems.     Physical exam  Alert and Oriented X3  PERRLA, EOMI  ARAIZA 5/5  Sensation intact to LT and PP  Reflexes are 2+ and symmetric    A/P: patient is here for follow up for: NPH.  I will recommend lumbar drain trial.  Risks, benefits and alternatives have been discussed and she wishes to proceed.    Maximus Maldonado MD

## 2024-04-19 ENCOUNTER — PREP FOR PROCEDURE (OUTPATIENT)
Dept: NEUROSURGERY | Age: 69
End: 2024-04-19

## 2024-04-19 ENCOUNTER — TELEPHONE (OUTPATIENT)
Dept: NEUROSURGERY | Age: 69
End: 2024-04-19

## 2024-04-19 DIAGNOSIS — G91.2 NPH (NORMAL PRESSURE HYDROCEPHALUS) (HCC): ICD-10-CM

## 2024-04-19 RX ORDER — SODIUM CHLORIDE 9 MG/ML
INJECTION, SOLUTION INTRAVENOUS PRN
Status: CANCELLED | OUTPATIENT
Start: 2024-04-19

## 2024-04-19 RX ORDER — SODIUM CHLORIDE 9 MG/ML
INJECTION, SOLUTION INTRAVENOUS CONTINUOUS
Status: CANCELLED | OUTPATIENT
Start: 2024-04-19

## 2024-04-19 RX ORDER — SODIUM CHLORIDE 0.9 % (FLUSH) 0.9 %
5-40 SYRINGE (ML) INJECTION EVERY 12 HOURS SCHEDULED
Status: CANCELLED | OUTPATIENT
Start: 2024-04-19

## 2024-04-19 RX ORDER — SODIUM CHLORIDE 0.9 % (FLUSH) 0.9 %
5-40 SYRINGE (ML) INJECTION PRN
Status: CANCELLED | OUTPATIENT
Start: 2024-04-19

## 2024-04-19 NOTE — TELEPHONE ENCOUNTER
Prior Authorization Form:      DEMOGRAPHICS:                     Patient Name:  Emili Esquivel  Patient :  1955            Insurance:  Payor: T MEDICARE / Plan: [employer] MEDICARE ADVANTAGE HMO / Product Type: Medicare /   Insurance ID Number:    Payer/Plan Subscr  Sex Relation Sub. Ins. ID Effective Group Num   1. [employer] MEDICAR* EMILI ESQUIVEL 1955 Female Self [id number] 3/1/24 [id number]                                    [id number]         DIAGNOSIS & PROCEDURE:                       Procedure/Operation: Placement of lumbar drain            CPT Code: 95302    Diagnosis:  NPH    ICD10 Code: G91.2    Location:  Main    Surgeon:  Erica    SCHEDULING INFORMATION:                          Date: 24                Anesthesia:  LMAC                                                       Status:  Outpatient        Special Comments:  none       Electronically signed by Jayne Garcia MA on 2024 at 9:48 AM

## 2024-05-01 NOTE — PROGRESS NOTES
[facility]   PRE-ADMISSION TESTING GENERAL INSTRUCTIONS  PAT Phone Number: [phone]      GENERAL INSTRUCTIONS:    [x] Antibacterial Soap Shower Night before and/or AM of surgery.  [] CHG Wipes instruction sheet and wipes given.  [x] Do not wear makeup, lotions, powders, deodorant the morning of surgery.  [x] Nothing to eat or drink after midnight. This includes no gum, candy, mints or water.  [x] You may brush your teeth, gargle, but do not swallow water.   [x] No tobacco products, illegal drugs, or alcohol within 24 hours of your surgery.  [x] Jewelry or valuables should not be brought to the hospital. All body and/or tongue piercing's must be removed prior to arriving to hospital. No contact lens or hair pins.   [x] Arrange transportation with a responsible adult  to and from the hospital. Arrange for someone to be with you for the remainder of the day and for 24 hours after your procedure due to having had anesthesia.          -Who will be your  for transportation?         -Who will be staying with you for 24 hrs after your procedure?   [x] Bring insurance card and photo ID.  [] Bring copy of living will or healthcare power of  papers to be placed in your electronic record.  [] Urine Pregnancy test will be preformed the day of surgery. A specimen sample may be brought from home.  [] Transfusion (Green) Bracelet: Please bring with you to hospital, day of surgery.     PARKING INSTRUCTIONS:     [x] ARRIVAL DATE & TIME: 5/7 at 1145  [x] Times are subject to change. We will contact you the business day before surgery if that were to occur.  [x] Enter into the [facility] Entrance. Two people may accompany you. Masks are not required.  [x] Parking Lot \"I\" is where you will park. It is located on the corner of [address] and [facility]. The entrance is on [facility].   Only one vehicle - per patient, is permitted in parking lot.   Upon  Left message for patient to return call.  (1st attempt)    wait for your nurse.   [x] Delays may occur with surgery and staff will make a sincere effort to keep you informed of delays. If any delays occur with your procedure, we apologize ahead of time for your inconvenience as we recognize the value of your time.

## 2024-05-07 ENCOUNTER — HOSPITAL ENCOUNTER (INPATIENT)
Age: 69
LOS: 3 days | Discharge: HOME | End: 2024-05-10
Attending: NEUROLOGICAL SURGERY | Admitting: NEUROLOGICAL SURGERY
Payer: MEDICARE

## 2024-05-07 ENCOUNTER — ANESTHESIA (OUTPATIENT)
Dept: OPERATING ROOM | Age: 69
End: 2024-05-07
Payer: MEDICARE

## 2024-05-07 ENCOUNTER — ANESTHESIA EVENT (OUTPATIENT)
Dept: OPERATING ROOM | Age: 69
End: 2024-05-07
Payer: MEDICARE

## 2024-05-07 DIAGNOSIS — G91.2 NPH (NORMAL PRESSURE HYDROCEPHALUS) (HCC): Primary | ICD-10-CM

## 2024-05-07 PROCEDURE — 62272 THER SPI PNXR DRG CSF: CPT | Performed by: NEUROLOGICAL SURGERY

## 2024-05-07 PROCEDURE — 3700000001 HC ADD 15 MINUTES (ANESTHESIA): Performed by: NEUROLOGICAL SURGERY

## 2024-05-07 PROCEDURE — 2709999900 HC NON-CHARGEABLE SUPPLY: Performed by: NEUROLOGICAL SURGERY

## 2024-05-07 PROCEDURE — 7100000000 HC PACU RECOVERY - FIRST 15 MIN

## 2024-05-07 PROCEDURE — 6360000002 HC RX W HCPCS: Performed by: NURSE ANESTHETIST, CERTIFIED REGISTERED

## 2024-05-07 PROCEDURE — 2580000003 HC RX 258: Performed by: PHYSICIAN ASSISTANT

## 2024-05-07 PROCEDURE — 009U30Z DRAINAGE OF SPINAL CANAL WITH DRAINAGE DEVICE, PERCUTANEOUS APPROACH: ICD-10-PCS | Performed by: NEUROLOGICAL SURGERY

## 2024-05-07 PROCEDURE — 2580000003 HC RX 258: Performed by: ANESTHESIOLOGY

## 2024-05-07 PROCEDURE — 2500000003 HC RX 250 WO HCPCS: Performed by: NEUROLOGICAL SURGERY

## 2024-05-07 PROCEDURE — 2580000003 HC RX 258: Performed by: NEUROLOGICAL SURGERY

## 2024-05-07 PROCEDURE — 3700000000 HC ANESTHESIA ATTENDED CARE: Performed by: NEUROLOGICAL SURGERY

## 2024-05-07 PROCEDURE — C1729 CATH, DRAINAGE: HCPCS | Performed by: NEUROLOGICAL SURGERY

## 2024-05-07 PROCEDURE — 6360000002 HC RX W HCPCS: Performed by: PHYSICIAN ASSISTANT

## 2024-05-07 PROCEDURE — 7100000001 HC PACU RECOVERY - ADDTL 15 MIN

## 2024-05-07 PROCEDURE — 3600000012 HC SURGERY LEVEL 2 ADDTL 15MIN: Performed by: NEUROLOGICAL SURGERY

## 2024-05-07 PROCEDURE — 6370000000 HC RX 637 (ALT 250 FOR IP): Performed by: NEUROLOGICAL SURGERY

## 2024-05-07 PROCEDURE — 2000000000 HC ICU R&B

## 2024-05-07 PROCEDURE — 87081 CULTURE SCREEN ONLY: CPT

## 2024-05-07 PROCEDURE — 3600000002 HC SURGERY LEVEL 2 BASE: Performed by: NEUROLOGICAL SURGERY

## 2024-05-07 RX ORDER — SODIUM CHLORIDE 9 MG/ML
INJECTION, SOLUTION INTRAVENOUS PRN
Status: DISCONTINUED | OUTPATIENT
Start: 2024-05-07 | End: 2024-05-07 | Stop reason: HOSPADM

## 2024-05-07 RX ORDER — SODIUM CHLORIDE 0.9 % (FLUSH) 0.9 %
5-40 SYRINGE (ML) INJECTION PRN
Status: DISCONTINUED | OUTPATIENT
Start: 2024-05-07 | End: 2024-05-07 | Stop reason: HOSPADM

## 2024-05-07 RX ORDER — SODIUM CHLORIDE 0.9 % (FLUSH) 0.9 %
5-40 SYRINGE (ML) INJECTION EVERY 12 HOURS SCHEDULED
Status: DISCONTINUED | OUTPATIENT
Start: 2024-05-07 | End: 2024-05-10 | Stop reason: HOSPADM

## 2024-05-07 RX ORDER — OXYCODONE HYDROCHLORIDE 10 MG/1
10 TABLET ORAL EVERY 4 HOURS PRN
Status: DISCONTINUED | OUTPATIENT
Start: 2024-05-07 | End: 2024-05-10 | Stop reason: HOSPADM

## 2024-05-07 RX ORDER — LIDOCAINE HYDROCHLORIDE AND EPINEPHRINE 5; 5 MG/ML; UG/ML
INJECTION, SOLUTION INFILTRATION; PERINEURAL PRN
Status: DISCONTINUED | OUTPATIENT
Start: 2024-05-07 | End: 2024-05-07 | Stop reason: ALTCHOICE

## 2024-05-07 RX ORDER — ACETAMINOPHEN 325 MG/1
650 TABLET ORAL EVERY 4 HOURS PRN
Status: DISCONTINUED | OUTPATIENT
Start: 2024-05-07 | End: 2024-05-10 | Stop reason: HOSPADM

## 2024-05-07 RX ORDER — ONDANSETRON 2 MG/ML
4 INJECTION INTRAMUSCULAR; INTRAVENOUS
Status: DISCONTINUED | OUTPATIENT
Start: 2024-05-07 | End: 2024-05-08

## 2024-05-07 RX ORDER — SODIUM CHLORIDE 0.9 % (FLUSH) 0.9 %
5-40 SYRINGE (ML) INJECTION PRN
Status: DISCONTINUED | OUTPATIENT
Start: 2024-05-07 | End: 2024-05-10 | Stop reason: HOSPADM

## 2024-05-07 RX ORDER — POLYETHYLENE GLYCOL 3350 17 G/17G
17 POWDER, FOR SOLUTION ORAL DAILY
Status: DISCONTINUED | OUTPATIENT
Start: 2024-05-07 | End: 2024-05-10 | Stop reason: HOSPADM

## 2024-05-07 RX ORDER — BISACODYL 5 MG/1
5 TABLET, DELAYED RELEASE ORAL DAILY
Status: DISCONTINUED | OUTPATIENT
Start: 2024-05-07 | End: 2024-05-10 | Stop reason: HOSPADM

## 2024-05-07 RX ORDER — FENTANYL CITRATE 50 UG/ML
INJECTION, SOLUTION INTRAMUSCULAR; INTRAVENOUS PRN
Status: DISCONTINUED | OUTPATIENT
Start: 2024-05-07 | End: 2024-05-07 | Stop reason: SDUPTHER

## 2024-05-07 RX ORDER — NALOXONE HYDROCHLORIDE 0.4 MG/ML
INJECTION, SOLUTION INTRAMUSCULAR; INTRAVENOUS; SUBCUTANEOUS PRN
Status: DISCONTINUED | OUTPATIENT
Start: 2024-05-07 | End: 2024-05-08

## 2024-05-07 RX ORDER — FENTANYL CITRATE 50 UG/ML
25 INJECTION, SOLUTION INTRAMUSCULAR; INTRAVENOUS EVERY 5 MIN PRN
Status: DISCONTINUED | OUTPATIENT
Start: 2024-05-07 | End: 2024-05-08

## 2024-05-07 RX ORDER — OXYCODONE HYDROCHLORIDE 5 MG/1
5 TABLET ORAL EVERY 4 HOURS PRN
Status: DISCONTINUED | OUTPATIENT
Start: 2024-05-07 | End: 2024-05-10 | Stop reason: HOSPADM

## 2024-05-07 RX ORDER — SODIUM CHLORIDE 9 MG/ML
INJECTION, SOLUTION INTRAVENOUS CONTINUOUS
Status: DISCONTINUED | OUTPATIENT
Start: 2024-05-07 | End: 2024-05-07 | Stop reason: HOSPADM

## 2024-05-07 RX ORDER — ONDANSETRON 4 MG/1
4 TABLET, ORALLY DISINTEGRATING ORAL EVERY 8 HOURS PRN
Status: DISCONTINUED | OUTPATIENT
Start: 2024-05-07 | End: 2024-05-10 | Stop reason: HOSPADM

## 2024-05-07 RX ORDER — PROPOFOL 10 MG/ML
INJECTION, EMULSION INTRAVENOUS PRN
Status: DISCONTINUED | OUTPATIENT
Start: 2024-05-07 | End: 2024-05-07 | Stop reason: SDUPTHER

## 2024-05-07 RX ORDER — ONDANSETRON 2 MG/ML
4 INJECTION INTRAMUSCULAR; INTRAVENOUS EVERY 6 HOURS PRN
Status: DISCONTINUED | OUTPATIENT
Start: 2024-05-07 | End: 2024-05-10 | Stop reason: HOSPADM

## 2024-05-07 RX ORDER — SODIUM CHLORIDE 0.9 % (FLUSH) 0.9 %
5-40 SYRINGE (ML) INJECTION EVERY 12 HOURS SCHEDULED
Status: DISCONTINUED | OUTPATIENT
Start: 2024-05-07 | End: 2024-05-07 | Stop reason: HOSPADM

## 2024-05-07 RX ORDER — ROSUVASTATIN CALCIUM 10 MG/1
10 TABLET, COATED ORAL DAILY
Status: DISCONTINUED | OUTPATIENT
Start: 2024-05-07 | End: 2024-05-10 | Stop reason: HOSPADM

## 2024-05-07 RX ORDER — SODIUM CHLORIDE 9 MG/ML
INJECTION, SOLUTION INTRAVENOUS PRN
Status: DISCONTINUED | OUTPATIENT
Start: 2024-05-07 | End: 2024-05-10 | Stop reason: HOSPADM

## 2024-05-07 RX ORDER — SODIUM CHLORIDE 9 MG/ML
INJECTION, SOLUTION INTRAVENOUS PRN
Status: DISCONTINUED | OUTPATIENT
Start: 2024-05-07 | End: 2024-05-08

## 2024-05-07 RX ORDER — LABETALOL HYDROCHLORIDE 5 MG/ML
20 INJECTION, SOLUTION INTRAVENOUS EVERY 10 MIN PRN
Status: DISCONTINUED | OUTPATIENT
Start: 2024-05-07 | End: 2024-05-10 | Stop reason: HOSPADM

## 2024-05-07 RX ORDER — SENNA AND DOCUSATE SODIUM 50; 8.6 MG/1; MG/1
1 TABLET, FILM COATED ORAL 2 TIMES DAILY
Status: DISCONTINUED | OUTPATIENT
Start: 2024-05-07 | End: 2024-05-10 | Stop reason: HOSPADM

## 2024-05-07 RX ORDER — SODIUM CHLORIDE 0.9 % (FLUSH) 0.9 %
5-40 SYRINGE (ML) INJECTION PRN
Status: DISCONTINUED | OUTPATIENT
Start: 2024-05-07 | End: 2024-05-08

## 2024-05-07 RX ADMIN — SODIUM CHLORIDE, PRESERVATIVE FREE 10 ML: 5 INJECTION INTRAVENOUS at 22:33

## 2024-05-07 RX ADMIN — POLYETHYLENE GLYCOL 3350 17 G: 17 POWDER, FOR SOLUTION ORAL at 16:25

## 2024-05-07 RX ADMIN — SENNOSIDES AND DOCUSATE SODIUM 1 TABLET: 50; 8.6 TABLET ORAL at 22:33

## 2024-05-07 RX ADMIN — FENTANYL CITRATE 50 MCG: 50 INJECTION, SOLUTION INTRAMUSCULAR; INTRAVENOUS at 13:50

## 2024-05-07 RX ADMIN — ACETAMINOPHEN 650 MG: 325 TABLET ORAL at 16:25

## 2024-05-07 RX ADMIN — CEFAZOLIN 2000 MG: 2 INJECTION, POWDER, FOR SOLUTION INTRAMUSCULAR; INTRAVENOUS at 13:51

## 2024-05-07 RX ADMIN — SODIUM CHLORIDE: 9 INJECTION, SOLUTION INTRAVENOUS at 13:41

## 2024-05-07 RX ADMIN — OXYCODONE 5 MG: 5 TABLET ORAL at 19:32

## 2024-05-07 RX ADMIN — BISACODYL 5 MG: 5 TABLET, COATED ORAL at 16:25

## 2024-05-07 RX ADMIN — FENTANYL CITRATE 50 MCG: 50 INJECTION, SOLUTION INTRAMUSCULAR; INTRAVENOUS at 14:12

## 2024-05-07 RX ADMIN — PROPOFOL 50 MG: 10 INJECTION, EMULSION INTRAVENOUS at 13:54

## 2024-05-07 RX ADMIN — ROSUVASTATIN 10 MG: 10 TABLET, FILM COATED ORAL at 16:25

## 2024-05-07 RX ADMIN — SODIUM CHLORIDE, PRESERVATIVE FREE 10 ML: 5 INJECTION INTRAVENOUS at 22:32

## 2024-05-07 ASSESSMENT — PAIN SCALES - GENERAL
PAINLEVEL_OUTOF10: 0
PAINLEVEL_OUTOF10: 6
PAINLEVEL_OUTOF10: 0

## 2024-05-07 ASSESSMENT — PAIN DESCRIPTION - DESCRIPTORS: DESCRIPTORS: ACHING;CRAMPING;DISCOMFORT

## 2024-05-07 ASSESSMENT — PAIN - FUNCTIONAL ASSESSMENT
PAIN_FUNCTIONAL_ASSESSMENT: NONE - DENIES PAIN
PAIN_FUNCTIONAL_ASSESSMENT: PREVENTS OR INTERFERES SOME ACTIVE ACTIVITIES AND ADLS

## 2024-05-07 ASSESSMENT — PAIN DESCRIPTION - ORIENTATION: ORIENTATION: RIGHT

## 2024-05-07 ASSESSMENT — PAIN DESCRIPTION - LOCATION: LOCATION: HIP

## 2024-05-07 NOTE — H&P
I have examined the patient and reviewed the H and P and no changes are noted    Maximus Maldonado MD

## 2024-05-07 NOTE — ANESTHESIA PRE PROCEDURE
Department of Anesthesiology  Preprocedure Note       Name:  Emili Esquivel   Age:  68 y.o.  :  1955                                          MRN:  [id number]         Date:  2024      Surgeon: Surgeon(s):  Maximus Maldonado MD    Procedure: Procedure(s):  Placement of LUMBAR DRAIN    Medications prior to admission:   Prior to Admission medications    Medication Sig Start Date End Date Taking? Authorizing Provider   meclizine (ANTIVERT) 25 MG tablet Take 1 tablet by mouth 3 times daily as needed for Dizziness 3/13/24   ProviderMicaela MD   rosuvastatin (CRESTOR) 10 MG tablet Take 1 tablet by mouth daily    Provider, MD Micaela       Current medications:    Current Facility-Administered Medications   Medication Dose Route Frequency Provider Last Rate Last Admin   • 0.9 % sodium chloride infusion   IntraVENous Continuous Carrol Flores PA-C       • sodium chloride flush 0.9 % injection 5-40 mL  5-40 mL IntraVENous 2 times per day Carrol Flores PA-C       • sodium chloride flush 0.9 % injection 5-40 mL  5-40 mL IntraVENous PRN Carrol Flores PA-C       • 0.9 % sodium chloride infusion   IntraVENous PRN Carrol Flores PA-C       • ceFAZolin (ANCEF) 2,000 mg in sterile water 20 mL IV syringe  2,000 mg IntraVENous On Call to OR Carrol Flores PA-C           Allergies:  No Known Allergies    Problem List:    Patient Active Problem List   Diagnosis Code   • Pigmented skin lesions L81.9   • SK (seborrheic keratosis) L82.1   • Hyperlipemia E78.5   • Normal pressure hydrocephalus (HCC) G91.2   • NPH (normal pressure hydrocephalus) (HCC) G91.2       Past Medical History:        Diagnosis Date   • Arthritis    • Cancer (HCC)     skin    • Hyperlipemia        Past Surgical History:        Procedure Laterality Date   • APPENDECTOMY     • CARPAL TUNNEL RELEASE Bilateral    • COLONOSCOPY     • HYSTERECTOMY (CERVIX STATUS UNKNOWN)         Social History:    Social History     Tobacco Use   • Smoking status:

## 2024-05-07 NOTE — PROGRESS NOTES
4 Eyes Skin Assessment     NAME:  Emili Esquivel  YOB: 1955  MEDICAL RECORD NUMBER:  [id number]    The patient is being assessed for  Admission    I agree that at least one RN has performed a thorough Head to Toe Skin Assessment on the patient. ALL assessment sites listed below have been assessed.      Areas assessed by both nurses:    Head, Face, Ears, Shoulders, Back, Chest, Arms, Elbows, Hands, Sacrum. Buttock, Coccyx, Ischium, Legs. Feet and Heels, and Under Medical Devices         Does the Patient have a Wound?  L Hand Scabbed Abrasion  Lumbar Drain Insertion Site with surgical dressing and small amount of blood  Bruise L Scapula       Guillermo Prevention initiated by RN: Yes  Wound Care Orders initiated by RN: No    Pressure Injury (Stage 3,4, Unstageable, DTI, NWPT, and Complex wounds) if present, place Wound referral order by RN under : No    New Ostomies, if present place, Ostomy referral order under : No     Nurse 1 eSignature: Electronically signed by Prema Otero RN on 5/7/24 at 2:59 PM EDT    **SHARE this note so that the co-signing nurse can place an eSignature**    Nurse 2 eSignature: Electronically signed by Xena Ventura RN on 5/7/24 at 4:54 PM EDT

## 2024-05-07 NOTE — H&P
HISTORY OF PRESENT ILLNESS:  The patient is a 68-year-old lady who presents to the hospital with her , he was actually in the emergency room.  However, while she was there she developed nausea and vomiting, and she states that she does get this as a result of motion sickness.  She was subsequently admitted for workup of her nausea and vomiting, and ultimately it was reported that a few days ago she did hit her head and she ultimately had a CT scan of her head that did show ventriculomegaly for which Neurosurgery Service is consulted.  On further questioning, she does admit to some occasional gait instability and some occasional urinary incontinence.     PAST MEDICAL HISTORY:  Positive for arthritis, skin cancer, and hyperlipidemia.     PAST SURGICAL HISTORY:  Positive for appendectomy, colonoscopy, hysterectomy, carpal tunnel release.     FAMILY HISTORY:  Positive for diabetes and high cholesterol in her father.     SOCIAL HISTORY:  Negative for tobacco or alcohol use.     ALLERGIES:  SHE HAS NO KNOWN DRUG ALLERGIES.        REVIEW OF SYSTEMS:  HEENT:  Positive for headache.  Negative for double vision, blurry vision.  CARDIOVASCULAR:  Negative for chest pain, arrhythmia, or palpitations.  RESPIRATORY:  Negative for shortness of breath, asthma, bronchitis, or pneumonia.  GASTROINTESTINAL:  Positive for nausea and vomiting.  GENITOURINARY:  Negative for hematuria or dysuria.  HEMATOLOGIC:  Negative for easy bleeding or bruising.  INFECTIOUS:  Negative for any recent infection.  MUSCULOSKELETAL:  Positive for joint stiffness or swelling.  PSYCHIATRIC:  Negative for anxiety or depression.  NEUROLOGIC:  Negative for seizure or stroke.  ENDOCRINE:  Negative for thyroid disorder or diabetes.     PHYSICAL EXAMINATION:  VITAL SIGNS:  She is currently afebrile with a T-current of 36.5 degrees Celsius, pulse 87, blood pressure 105/62, respiratory rate is 16.  GENERAL:  She is resting in bed.  Does not appear to be in

## 2024-05-07 NOTE — ANESTHESIA POSTPROCEDURE EVALUATION
Department of Anesthesiology  Postprocedure Note    Patient: Emili Esquivel  MRN: [id number]  YOB: 1955  Date of evaluation: 5/7/2024    Procedure Summary       Date: 05/07/24 Room / Location: [address]    Anesthesia Start: 1341 Anesthesia Stop: 1442    Procedure: Placement of LUMBAR DRAIN (Back) Diagnosis:       NPH (normal pressure hydrocephalus) (HCC)      (NPH (normal pressure hydrocephalus) (HCC) [G91.2])    Surgeons: Maximus Maldonado MD Responsible Provider: Margaret Springer MD    Anesthesia Type: MAC ASA Status: 3            Anesthesia Type: MAC    An Phase I: An Score: 10    An Phase II:      Anesthesia Post Evaluation    Patient location during evaluation: PACU  Patient participation: complete - patient participated  Level of consciousness: awake  Pain score: 0  Airway patency: patent  Nausea & Vomiting: no nausea  Cardiovascular status: hemodynamically stable  Respiratory status: acceptable  Hydration status: stable  Multimodal analgesia pain management approach    No notable events documented.

## 2024-05-07 NOTE — BRIEF OP NOTE
Brief Postoperative Note      Patient: Emili Esquivel  YOB: 1955  MRN: [id number]    Date of Procedure: 5/7/2024    Pre-Op Diagnosis Codes:     * NPH (normal pressure hydrocephalus) ([facility]) [G91.2]    Post-Op Diagnosis: Same       Procedure(s):  Placement of LUMBAR DRAIN    Surgeon(s):  Maximus Maldonado MD    Assistant:  * No surgical staff found *    Anesthesia: Monitor Anesthesia Care    Estimated Blood Loss (mL): Minimal    Complications: None    Specimens:   * No specimens in log *    Implants:  * No implants in log *      Drains:   External Urinary Catheter (Active)       Lumbar Drain (Active)   Site Description Unable to view 05/07/24 1430   Dressing Status Intact;New drainage noted 05/07/24 1430   Drain Status Clamped 05/07/24 1430   Drainage System Type Gravity 05/07/24 1430   CSF Color Clear 05/07/24 1430   Output (ml) 10 ml 05/07/24 1600       Findings:  Infection Present At Time Of Surgery (PATOS) (choose all levels that have infection present):  No infection present  Other Findings: see dictated op note    Electronically signed by Maximus Maldonado MD on 5/7/2024 at 5:02 PM

## 2024-05-07 NOTE — PROGRESS NOTES
Neuro Science Intensive Care Unit  Critical Care  Critical Care Consult Note  5/7/2024    Date of Admission: 05/07/2024    CC: Follow up for lumbar drain placement    HOSPITAL COURSE/OVERNIGHT EVENTS:  69 yo woman admitted post operatively to SICU for Neuro ICU after placement of lumbar drain.  Had nausea & vomiting with motion sickness.  She also admitted to occasional incontinence,  Work up identified ventriculomegaly.      PMH:   Past Medical History:   Diagnosis Date    Arthritis     Cancer (HCC)     skin     Hyperlipemia      PSH:   Past Surgical History:   Procedure Laterality Date    APPENDECTOMY      CARPAL TUNNEL RELEASE Bilateral     COLONOSCOPY      HYSTERECTOMY (CERVIX STATUS UNKNOWN)       Home Medications:   Prior to Admission medications    Medication Sig Start Date End Date Taking? Authorizing Provider   meclizine (ANTIVERT) 25 MG tablet Take 1 tablet by mouth 3 times daily as needed for Dizziness 3/13/24   Micaela Riddle MD   rosuvastatin (CRESTOR) 10 MG tablet Take 1 tablet by mouth daily    ProviderMicaela MD     Allergies:   No Known Allergies    Social History:  Social History     Socioeconomic History    Marital status:      Spouse name: Not on file    Number of children: Not on file    Years of education: Not on file    Highest education level: Not on file   Occupational History    Not on file   Tobacco Use    Smoking status: Never    Smokeless tobacco: Never   Vaping Use    Vaping Use: Never used   Substance and Sexual Activity    Alcohol use: No    Drug use: No    Sexual activity: Not on file   Other Topics Concern    Not on file   Social History Narrative    Not on file     Social Determinants of Health     Financial Resource Strain: Not on file   Food Insecurity: No Food Insecurity (5/7/2024)    Hunger Vital Sign     Worried About Running Out of Food in the Last Year: Never true     Ran Out of Food in the Last Year: Never true   Transportation Needs: No Transportation

## 2024-05-08 LAB
ALBUMIN SERPL-MCNC: 4.2 G/DL (ref 3.5–5.2)
ALP SERPL-CCNC: 86 U/L (ref 35–104)
ALT SERPL-CCNC: 17 U/L (ref 0–32)
ANION GAP SERPL CALCULATED.3IONS-SCNC: 15 MMOL/L (ref 7–16)
AST SERPL-CCNC: 17 U/L (ref 0–31)
BASOPHILS # BLD: 0.04 K/UL (ref 0–0.2)
BASOPHILS NFR BLD: 1 % (ref 0–2)
BILIRUB SERPL-MCNC: 0.6 MG/DL (ref 0–1.2)
BUN SERPL-MCNC: 11 MG/DL (ref 6–23)
CA-I BLD-SCNC: 1.15 MMOL/L (ref 1.15–1.33)
CALCIUM SERPL-MCNC: 9.4 MG/DL (ref 8.6–10.2)
CHLORIDE SERPL-SCNC: 102 MMOL/L (ref 98–107)
CO2 SERPL-SCNC: 23 MMOL/L (ref 22–29)
CREAT SERPL-MCNC: 0.6 MG/DL (ref 0.5–1)
EOSINOPHIL # BLD: 0.09 K/UL (ref 0.05–0.5)
EOSINOPHILS RELATIVE PERCENT: 1 % (ref 0–6)
ERYTHROCYTE [DISTWIDTH] IN BLOOD BY AUTOMATED COUNT: 13.3 % (ref 11.5–15)
GFR, ESTIMATED: >90 ML/MIN/1.73M2
GLUCOSE SERPL-MCNC: 100 MG/DL (ref 74–99)
HCT VFR BLD AUTO: 42.5 % (ref 34–48)
HGB BLD-MCNC: 14 G/DL (ref 11.5–15.5)
IMM GRANULOCYTES # BLD AUTO: 0.03 K/UL (ref 0–0.58)
IMM GRANULOCYTES NFR BLD: 0 % (ref 0–5)
LYMPHOCYTES NFR BLD: 1.96 K/UL (ref 1.5–4)
LYMPHOCYTES RELATIVE PERCENT: 27 % (ref 20–42)
MAGNESIUM SERPL-MCNC: 2.2 MG/DL (ref 1.6–2.6)
MCH RBC QN AUTO: 30.4 PG (ref 26–35)
MCHC RBC AUTO-ENTMCNC: 32.9 G/DL (ref 32–34.5)
MCV RBC AUTO: 92.2 FL (ref 80–99.9)
MONOCYTES NFR BLD: 0.34 K/UL (ref 0.1–0.95)
MONOCYTES NFR BLD: 5 % (ref 2–12)
NEUTROPHILS NFR BLD: 66 % (ref 43–80)
NEUTS SEG NFR BLD: 4.79 K/UL (ref 1.8–7.3)
PHOSPHATE SERPL-MCNC: 3.9 MG/DL (ref 2.5–4.5)
PLATELET CONFIRMATION: NORMAL
PLATELET, FLUORESCENCE: 161 K/UL (ref 130–450)
PMV BLD AUTO: 10.1 FL (ref 7–12)
POTASSIUM SERPL-SCNC: 3.7 MMOL/L (ref 3.5–5)
PROT SERPL-MCNC: 7.1 G/DL (ref 6.4–8.3)
RBC # BLD AUTO: 4.61 M/UL (ref 3.5–5.5)
SODIUM SERPL-SCNC: 140 MMOL/L (ref 132–146)
WBC OTHER # BLD: 7.3 K/UL (ref 4.5–11.5)

## 2024-05-08 PROCEDURE — 97530 THERAPEUTIC ACTIVITIES: CPT

## 2024-05-08 PROCEDURE — APPSS30 APP SPLIT SHARED TIME 16-30 MINUTES: Performed by: NURSE PRACTITIONER

## 2024-05-08 PROCEDURE — 83735 ASSAY OF MAGNESIUM: CPT

## 2024-05-08 PROCEDURE — 85025 COMPLETE CBC W/AUTO DIFF WBC: CPT

## 2024-05-08 PROCEDURE — 80053 COMPREHEN METABOLIC PANEL: CPT

## 2024-05-08 PROCEDURE — 82330 ASSAY OF CALCIUM: CPT

## 2024-05-08 PROCEDURE — 2580000003 HC RX 258: Performed by: ANESTHESIOLOGY

## 2024-05-08 PROCEDURE — 6360000002 HC RX W HCPCS: Performed by: NEUROLOGICAL SURGERY

## 2024-05-08 PROCEDURE — 6360000002 HC RX W HCPCS

## 2024-05-08 PROCEDURE — 97166 OT EVAL MOD COMPLEX 45 MIN: CPT

## 2024-05-08 PROCEDURE — 2000000000 HC ICU R&B

## 2024-05-08 PROCEDURE — 6370000000 HC RX 637 (ALT 250 FOR IP)

## 2024-05-08 PROCEDURE — 2580000003 HC RX 258: Performed by: NEUROLOGICAL SURGERY

## 2024-05-08 PROCEDURE — 97162 PT EVAL MOD COMPLEX 30 MIN: CPT

## 2024-05-08 PROCEDURE — 84100 ASSAY OF PHOSPHORUS: CPT

## 2024-05-08 PROCEDURE — 6370000000 HC RX 637 (ALT 250 FOR IP): Performed by: NEUROLOGICAL SURGERY

## 2024-05-08 PROCEDURE — 99291 CRITICAL CARE FIRST HOUR: CPT | Performed by: SURGERY

## 2024-05-08 RX ORDER — PROCHLORPERAZINE EDISYLATE 5 MG/ML
10 INJECTION INTRAMUSCULAR; INTRAVENOUS EVERY 6 HOURS PRN
Status: DISCONTINUED | OUTPATIENT
Start: 2024-05-08 | End: 2024-05-10 | Stop reason: HOSPADM

## 2024-05-08 RX ORDER — SCOLOPAMINE TRANSDERMAL SYSTEM 1 MG/1
1 PATCH, EXTENDED RELEASE TRANSDERMAL
Status: DISCONTINUED | OUTPATIENT
Start: 2024-05-08 | End: 2024-05-10 | Stop reason: HOSPADM

## 2024-05-08 RX ORDER — ACETAMINOPHEN 650 MG
TABLET, EXTENDED RELEASE ORAL
Status: DISPENSED
Start: 2024-05-08 | End: 2024-05-09

## 2024-05-08 RX ADMIN — ACETAMINOPHEN 650 MG: 325 TABLET ORAL at 02:18

## 2024-05-08 RX ADMIN — PROCHLORPERAZINE EDISYLATE 10 MG: 5 INJECTION INTRAMUSCULAR; INTRAVENOUS at 16:19

## 2024-05-08 RX ADMIN — ONDANSETRON 4 MG: 2 INJECTION INTRAMUSCULAR; INTRAVENOUS at 13:14

## 2024-05-08 RX ADMIN — ONDANSETRON 4 MG: 2 INJECTION INTRAMUSCULAR; INTRAVENOUS at 06:04

## 2024-05-08 RX ADMIN — ACETAMINOPHEN 650 MG: 325 TABLET ORAL at 08:17

## 2024-05-08 RX ADMIN — SODIUM CHLORIDE, PRESERVATIVE FREE 10 ML: 5 INJECTION INTRAVENOUS at 08:07

## 2024-05-08 RX ADMIN — SODIUM CHLORIDE, PRESERVATIVE FREE 10 ML: 5 INJECTION INTRAVENOUS at 19:33

## 2024-05-08 RX ADMIN — SENNOSIDES AND DOCUSATE SODIUM 1 TABLET: 50; 8.6 TABLET ORAL at 19:33

## 2024-05-08 RX ADMIN — ROSUVASTATIN 10 MG: 10 TABLET, FILM COATED ORAL at 08:05

## 2024-05-08 RX ADMIN — PROCHLORPERAZINE EDISYLATE 10 MG: 5 INJECTION INTRAMUSCULAR; INTRAVENOUS at 22:20

## 2024-05-08 RX ADMIN — OXYCODONE 10 MG: 5 TABLET ORAL at 21:40

## 2024-05-08 RX ADMIN — LABETALOL HYDROCHLORIDE 20 MG: 5 INJECTION INTRAVENOUS at 15:04

## 2024-05-08 RX ADMIN — ONDANSETRON 4 MG: 2 INJECTION INTRAMUSCULAR; INTRAVENOUS at 21:40

## 2024-05-08 RX ADMIN — PROCHLORPERAZINE EDISYLATE 10 MG: 5 INJECTION INTRAMUSCULAR; INTRAVENOUS at 09:45

## 2024-05-08 RX ADMIN — SODIUM CHLORIDE, PRESERVATIVE FREE 10 ML: 5 INJECTION INTRAVENOUS at 08:06

## 2024-05-08 ASSESSMENT — PAIN SCALES - GENERAL
PAINLEVEL_OUTOF10: 0
PAINLEVEL_OUTOF10: 4
PAINLEVEL_OUTOF10: 6
PAINLEVEL_OUTOF10: 4
PAINLEVEL_OUTOF10: 8
PAINLEVEL_OUTOF10: 0
PAINLEVEL_OUTOF10: 6
PAINLEVEL_OUTOF10: 0

## 2024-05-08 ASSESSMENT — PAIN DESCRIPTION - DESCRIPTORS
DESCRIPTORS: SORE;DISCOMFORT
DESCRIPTORS: ACHING
DESCRIPTORS: ACHING

## 2024-05-08 ASSESSMENT — PAIN DESCRIPTION - ORIENTATION: ORIENTATION: MID

## 2024-05-08 ASSESSMENT — PAIN DESCRIPTION - LOCATION
LOCATION: THROAT
LOCATION: HEAD
LOCATION: NECK

## 2024-05-08 ASSESSMENT — PAIN - FUNCTIONAL ASSESSMENT: PAIN_FUNCTIONAL_ASSESSMENT: ACTIVITIES ARE NOT PREVENTED

## 2024-05-08 NOTE — PROGRESS NOTES
Department of Neurosurgery  Progress Note    CHIEF COMPLAINT: s/p lumbar drain for possible NPH    SUBJECTIVE:  c/o nausea.  No improvement of ambulation or incontinence    REVIEW OF SYSTEMS :  Constitutional: Negative for chills and fever.    Neurological: Negative for dizziness, tremors and speech change.     OBJECTIVE:   VITALS:  /80   Pulse 91   Temp 97.9 °F (36.6 °C) (Temporal)   Resp 12   Ht 1.676 m (5' 6\")   Wt 72.6 kg (160 lb)   SpO2 98%   BMI 25.82 kg/m²   PHYSICAL:  CONSTITUTIONAL:  awake, alert, cooperative, no apparent distress, and appears stated age.  CATHY ARAIZA.  Recent memory 3/3    DATA:  CBC:   Lab Results   Component Value Date/Time    WBC 7.3 05/08/2024 05:19 AM    RBC 4.61 05/08/2024 05:19 AM    HGB 14.0 05/08/2024 05:19 AM    HCT 42.5 05/08/2024 05:19 AM    MCV 92.2 05/08/2024 05:19 AM    MCH 30.4 05/08/2024 05:19 AM    MCHC 32.9 05/08/2024 05:19 AM    RDW 13.3 05/08/2024 05:19 AM     03/30/2024 07:57 PM    MPV 10.1 05/08/2024 05:19 AM     BMP:    Lab Results   Component Value Date/Time     05/08/2024 05:19 AM    K 3.7 05/08/2024 05:19 AM     05/08/2024 05:19 AM    CO2 23 05/08/2024 05:19 AM    BUN 11 05/08/2024 05:19 AM    CREATININE 0.6 05/08/2024 05:19 AM    CALCIUM 9.4 05/08/2024 05:19 AM    LABGLOM >90 05/08/2024 05:19 AM    LABGLOM >90 03/31/2024 06:09 AM    GLUCOSE 100 05/08/2024 05:19 AM     PT/INR:  No results found for: \"PROTIME\", \"INR\"  PTT:  No results found for: \"APTT\"[APTT}    Current Inpatient Medications  Current Facility-Administered Medications: prochlorperazine (COMPAZINE) injection 10 mg, 10 mg, IntraVENous, Q6H PRN  sodium chloride flush 0.9 % injection 5-40 mL, 5-40 mL, IntraVENous, 2 times per day  rosuvastatin (CRESTOR) tablet 10 mg, 10 mg, Oral, Daily  sodium chloride flush 0.9 % injection 5-40 mL, 5-40 mL, IntraVENous, 2 times per day  sodium chloride flush 0.9 % injection 5-40 mL, 5-40 mL, IntraVENous, PRN  0.9 % sodium chloride infusion,

## 2024-05-08 NOTE — OP NOTE
[facility]              [address] [place] [address]                            OPERATIVE REPORT      PATIENT NAME: HARIS VERGARA                 : 1955  MED REC NO: [id number]                        ROOM: 3802  ACCOUNT NO: [id number]                       ADMIT DATE: 2024  PROVIDER: Maximus Maldonado MD      DATE OF PROCEDURE:  2024    SURGEON:  Maximus Maldonado MD    PREOPERATIVE DIAGNOSIS:  Normal-pressure hydrocephalus.    POSTOPERATIVE DIAGNOSIS:  Normal-pressure hydrocephalus.    OPERATIVE PROCEDURE:  Placement of lumbar subarachnoid drain.    ANESTHESIA:  Monitored anesthesia care.    ASSISTANT:  None.    COMPLICATIONS:  None.    ESTIMATED BLOOD LOSS:  Minimal.    SPECIMEN:  None.    OPERATIVE INDICATIONS:  The patient is a 68-year-old lady who presented to the office with gait instability, headaches, memory problems, and urinary incontinence.  She had an MRI that showed ventriculomegaly and it was suspected that she had normal pressure hydrocephalus, and after risks, benefits, and alternatives were discussed with the patient, it was determined that she would undergo the above-listed procedure.    DESCRIPTION OF PROCEDURE:  The patient was brought into the operating room.  A time-out was performed where she was identified by her name, medical record number, and the operative procedure which she was brought to undergo.  Next, induction of monitored anesthesia care was then commenced.  Upon completion of induction of monitored anesthesia care, she received preoperative antibiotics.  She was positioned in the sitting position on the operating table.  The lumbosacral region was prepped and draped in usual sterile fashion.  Next, I identified the L4-5 interspace.  I then proceeded to then insert a Tuohy needle.  I felt a pop, I removed the inner stylet.  There was egress of clear CSF.  I then proceeded to then insert a lumbar subarachnoid drain to
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
## 2024-05-08 NOTE — PROGRESS NOTES
[facility]  SURGICAL INTENSIVE CARE UNIT (SICU)  ATTENDING PHYSICIAN CRITICAL CARE PROGRESS NOTE     I have personally examined the patient, personally reviewed the record, and personally discussed the case with the resident. I have personally reviewed all relevant labs and imaging data. I am actively managing this patient's medications.  Please refer to the resident's note. I agree with the assessment and plan with the following corrections/additions. The following summarizes my clinical findings and independent assessment.     CC: critical care management after lumbar drain    Hospital Course/Overnight Events:  --underwent lumbar drain for NPH  --some nausea this AM    Pt denies pain or dizziness.  Reports slight ongoing nausea.    Medications   Scheduled Meds:    sodium chloride flush  5-40 mL IntraVENous 2 times per day    rosuvastatin  10 mg Oral Daily    sodium chloride flush  5-40 mL IntraVENous 2 times per day    polyethylene glycol  17 g Oral Daily    bisacodyl  5 mg Oral Daily    sennosides-docusate sodium  1 tablet Oral BID     Continuous Infusions:    sodium chloride       PRN Meds: prochlorperazine, sodium chloride flush, sodium chloride, ondansetron **OR** ondansetron, acetaminophen, oxyCODONE **OR** oxyCODONE, labetalol    Physical Examination      Vitals:  /71   Pulse 72   Temp 98 °F (36.7 °C) (Temporal)   Resp 13   Ht 1.676 m (5' 6\")   Wt 72.6 kg (160 lb)   SpO2 95%   BMI 25.82 kg/m²   Temp (24hrs), Av.2 °F (36.8 °C), Min:97.7 °F (36.5 °C), Max:98.8 °F (37.1 °C)      I/O (24Hr):    Intake/Output Summary (Last 24 hours) at 2024 1056  Last data filed at 2024 1000  Gross per 24 hour   Intake 620 ml   Output 1490 ml   Net -870 ml       Physical Exam    Labs/Imaging/Diagnostics   Labs:  personally reviewed  CBC:  Recent Labs     24  0519   WBC 7.3   RBC 4.61   HGB 14.0   HCT 42.5   MCV 92.2   RDW 13.3     CHEMISTRIES:  Recent Labs

## 2024-05-08 NOTE — PLAN OF CARE
Problem: Discharge Planning  Goal: Discharge to home or other facility with appropriate resources  Outcome: Progressing     Problem: Safety - Adult  Goal: Free from fall injury  5/7/2024 2039 by Kandace Jordan RN  Outcome: Progressing  5/7/2024 1451 by Prema Otero RN  Outcome: Progressing     Problem: ABCDS Injury Assessment  Goal: Absence of physical injury  5/7/2024 2039 by Kandace Jordan RN  Outcome: Progressing  5/7/2024 1451 by Prema Otero RN  Outcome: Progressing     Problem: Chronic Conditions and Co-morbidities  Goal: Patient's chronic conditions and co-morbidity symptoms are monitored and maintained or improved  5/7/2024 2039 by Kandace Jordan RN  Outcome: Progressing  5/7/2024 1451 by Prema Otero RN  Outcome: Progressing     Problem: Pain  Goal: Verbalizes/displays adequate comfort level or baseline comfort level  5/7/2024 2039 by Kandace Jordan RN  Outcome: Progressing  5/7/2024 1451 by Prema Otero RN  Outcome: Progressing     Problem: Skin/Tissue Integrity  Goal: Absence of new skin breakdown  Description: 1.  Monitor for areas of redness and/or skin breakdown  2.  Assess vascular access sites hourly  3.  Every 4-6 hours minimum:  Change oxygen saturation probe site  4.  Every 4-6 hours:  If on nasal continuous positive airway pressure, respiratory therapy assess nares and determine need for appliance change or resting period.  5/7/2024 2039 by Kandace Jordan RN  Outcome: Progressing  5/7/2024 1451 by Prema Otero RN  Outcome: Progressing     Problem: Neurosensory - Adult  Goal: Achieves maximal functionality and self care  5/7/2024 2039 by Kandace Jordan RN  Outcome: Progressing  5/7/2024 1451 by Prema Otero RN  Outcome: Progressing     Problem: Respiratory - Adult  Goal: Achieves optimal ventilation and oxygenation  Outcome: Progressing     Problem: Cardiovascular - Adult  Goal: Maintains optimal cardiac output and hemodynamic stability  Outcome: Progressing

## 2024-05-08 NOTE — PROGRESS NOTES
Neuro Science Intensive Care Unit  Critical Care  Daily Progress Note 5/8/2024    Date of Admission: 05/07/2024     CC: Follow up for lumbar drain placement     HOSPITAL COURSE/OVERNIGHT EVENTS:  69 yo woman admitted post operatively to SICU for Neuro ICU after placement of lumbar drain.  Had nausea & vomiting with motion sickness.  She also admitted to occasional incontinence,  Work up identified ventriculomegaly.    5/8  No issues overnight.  Nauseated this am but this is her baseline with nausea in the morning.       PHYSICAL EXAM:   /80   Pulse 91   Temp 97.9 °F (36.6 °C) (Temporal)   Resp 12   Ht 1.676 m (5' 6\")   Wt 72.6 kg (160 lb)   SpO2 98%   BMI 25.82 kg/m²     Intake/Output Summary (Last 24 hours) at 5/8/2024 0916  Last data filed at 5/8/2024 0800  Gross per 24 hour   Intake 620 ml   Output 1470 ml   Net -850 ml      General appearance:  Comfortable.   Pain Description: none    NEUROLOGIC:   RASS Score:  0  GCS:  15  4 - Opens eyes on own   6 - Follows simple motor commands  5 - Alert and oriented       Pupil size:  Left 3 mm  Right 3 mm  Pupil reaction: Yes   PERRLA  Wiggles fingers: Left   Yes  Right Yes  Hand grasp:   Left: normal.   Right  normal  Wiggles toes: Left   Yes Right   Yes   Plantar flexion:  Left  normal Right   normal    Lumbar drainage:  clear  Previous 8 hour: 110 ml  Previous 24 hour:  230 ml    CONSTITUTIONAL: No acute distress, lying in hospital bed.    CARDIOVASCULAR: Monitor: NSR.  S1 S2.  Regular rate, regular rhythm.  .  No murmur/gallop/rub.  PULMONARY: Bilateral clear breath sounds   No rhonchi/rales/wheezes, no use of accessory muscles.  O2: Room air.     RENAL: Voids  Fluid balance previous 24 hours:  - 840 ml .    ABDOMEN: Soft, nontender, nondistended, nontympanic, normal bowel sounds. Diet:  Regular.  No reported nausea or vomiting.  Last BM:  PTA  MUSCULOSKELETAL:  Moves all extremities purposefully.    SKIN/EXTREMITIES: No rashes/ecchymosis, no  edema/clubbing, warm/dry, good capillary refill.      LINES:   Peripheral    LABS:      Recent Labs     05/08/24  0519   WBC 7.3   HGB 14.0   HCT 42.5   MCV 92.2     Recent Labs     05/08/24  0519      K 3.7   CO2 23   PHOS 3.9   BUN 11   CREATININE 0.6     ASSESSMENT/PLAN:       Principal Problem:    NPH (normal pressure hydrocephalus) ([facility])  Resolved Problems:    * No resolved hospital problems. *    Neuro:  NPH.  Insertion of lumbar drain.    Monitor neuro status.    Neurosurgery following.  Monitor lumbar drainage.    Drain 10 ml per hour.  CV:  No acute issues.  Hx of Hyperlipidemia              Monitor hemodynamics.    BP goal systolic less than 150 mm Hg.    PRN labetalol.    Statin.    Pulm: No acute issues.    Monitor RR & SpO2.    O2 as needed.    Encourage cough, SMI  & deep breathing.    GI: No acute issues.  BMI 25.   Monitor bowel function.   Diet:  Regular  Zofran  Bowel regime.    Renal:  No acute issues.    Monitor BUN & Cr, electrolytes & replace as needed.    Monitor I & O.    Voids.   ID: No acute issues.     Endocrine: No acute issues.   Monitor BS.    MSK: No acute issues.   Hx of arthritis    ROM.   Turn & reposition.   PT/OT  Recommendations pending,   Monitor for skin breakdown.    Heme: No acute issues.   Monitor CBC.       Bowel regime: Senna, Bisacodyl , and Glycolax  Pain control/Sedation: Oxycodone  DVT prophylaxis: SCD and No Lovenox/Heparin at this time due to lumbar drain  GI prophylaxis: Diet  Ancillary consults:   Neurosurgery and Critical care  Patient/Family update:  Questions answered.  Support given.  Code status:   Full code    Disposition:   Continues SICU for Neuro ICU    Total time for caring for this patient, including direct patient contact, review of data including imaging & laboratory studies, discussions with other team members & physicians at least 25 minutes so far today.  Please feel free to call with questions or concerns.      Electronically signed by Chante

## 2024-05-08 NOTE — PROGRESS NOTES
functional performance during ADLs/IADLs                                         Therapeutic exercise to improve tolerance and functional strength for ADLs   Balance retraining exercises/tasks for facilitation of postural control with dynamic challenges during ADLs .  Positioning to improve functional independence  Neuromuscular re-education: facilitation of righting/equilibrium reactions, normalization muscle tone/facilitation active functional movement                      Delirium prevention/treatment    Modified Carlos Scale   Score     Description  0             No symptoms  1             No significant disability despite symptoms  2             Slight disability; able to look after own affairs  3             Moderate disability; able to ambulate without assist/ requires assist with ADLs  4             Moderate/Severe disability;requires assist to ambulate/assist with ADLs  5             Severe disability;bedridden/incontinent   6               Score:   4    Recommended Adaptive Equipment: TBA: ADL AE, AD, bathroom DME     Home Living: Pt lives with  (able to assist)  in a 2 floor plan with 3 step(s) to enter and no rail(s); bed/bath on 2nd floor: flight with rail  Bathroom setup: walk in shower (first floor)  Equipment owned: shower seat; no rails    Prior Level of Function: IND with ADLs;  IND with IADLs.   No AD for ambulation.   Driving: yes    Pain Level: pt c/o 8/10 headache pain  this session    Cognition: A&O: /    Follows 1-2 step commands as physically able   Memory: good; pt reports intermittent memory deficits    Comprehension good   Problem solving: good   Judgement/safety: good               Communication skills: WFL           Vision: WFL; denies vision changes               Glasses:yes                                                   Hearing: WFL     RASS: 0  CAM-ICU: (NT) Delirium    UE Assessment:  Hand Dominance: Right [x]  Left []      ROM Strength   RUE  WFL 4/5   LUE WFL  4/5     Sensation: No  c/o numbness/tingling   Tone: WNL   Edema: WFL     Functional Assessment:  AM-PAC Daily Activity Raw Score: 14/24   Initial Eval Status  Date: 5/8/24 Treatment Status  Date: STGs = LTGs  Time frame: 7-14 days   Feeding S; set up  (Nausea/ emesis)                        Salvador  while seated up in chair to increase activity tolerance        Grooming Min A; set up  Bed level                        Salvador   while standing sink level requiring AD as needed for balance and demonstrating G tolerance      UB dressing/bathing Mod A                        Salvador       LB dressing/bathing NT   -due to nausea/ emesis when seated EOB (RN aware)                        Salvador   using AE as needed for safe reach/ energy conservation       Toileting NT                        Salvador     Bed Mobility  Supine to sit:   SBA with HOB elevated    Sit to supine:   SBA                        Salvador  in prep of ADL tasks & transfers   Functional Transfers Sit to stand:   NT    Stand to sit:   NT                        Salvador  sit<>stand/functional bathroom transfers using AD/DME as needed for balance and safety   Functional Mobility NT                       Salvador   functional/bathroom mobility using AD as needed & demonstrating G safety     Balance Sitting:     Static:  S    Dynamic:SBA  Standing: NT  Salvador dynamic sitting balance; Salvador dynamic standing balance  during ADL tasks & transfers   Endurance/  Activity Tolerance   fair - tolerance with light activity.   G   tolerance with moderate activity/self care routine   Visual/  Perceptual   WFL                       Vitals:  Heart Rate at rest 85 bpm Heart Rate post session 85 bpm   SpO2 at rest 97% SpO2 post session 97%   Blood Pressure at rest 100/80 mmHg Blood Pressure post session 106/79 mmHg     Treatment: OT treatment provided this date includes:  Bed mobility: Instruction on precautions prior to bed mobility to facilitate safe transfers and ADLS. HOB elevated to assist.

## 2024-05-08 NOTE — CARE COORDINATION
5/8 Care Coordination:Pt was a Elective admit for Lumbar Drain. Admit to SICU post Op. Pt Hx was she  came to the hospital with her , he was actually in the emergency room. However, while she was there she developed nausea and vomiting, and she states that she does get this as a result of motion sickness. She was subsequently admitted for workup of her nausea and vomiting, and ultimately it was reported that a few days ago she did hit her head and she ultimately had a CT scan of her head that did show ventriculometry. Spoke with Pt in her room. PTA she was Independent. No Hx of FUNMILAYO or HHC. Her plan is to return home. Her  is fine and can help if needed. No needs at this time. CM/SW will continue to follow for discharge planning.   Nasir BLEDSOE,RN--BC  [phone]

## 2024-05-09 LAB
ALBUMIN SERPL-MCNC: 4.3 G/DL (ref 3.5–5.2)
ALP SERPL-CCNC: 81 U/L (ref 35–104)
ALT SERPL-CCNC: 15 U/L (ref 0–32)
ANION GAP SERPL CALCULATED.3IONS-SCNC: 20 MMOL/L (ref 7–16)
AST SERPL-CCNC: 15 U/L (ref 0–31)
BASOPHILS # BLD: 0.01 K/UL (ref 0–0.2)
BASOPHILS NFR BLD: 0 % (ref 0–2)
BILIRUB SERPL-MCNC: 0.6 MG/DL (ref 0–1.2)
BUN SERPL-MCNC: 16 MG/DL (ref 6–23)
CA-I BLD-SCNC: 1.17 MMOL/L (ref 1.15–1.33)
CALCIUM SERPL-MCNC: 9.9 MG/DL (ref 8.6–10.2)
CHLORIDE SERPL-SCNC: 102 MMOL/L (ref 98–107)
CO2 SERPL-SCNC: 19 MMOL/L (ref 22–29)
CREAT SERPL-MCNC: 0.7 MG/DL (ref 0.5–1)
EOSINOPHIL # BLD: 0 K/UL (ref 0.05–0.5)
EOSINOPHILS RELATIVE PERCENT: 0 % (ref 0–6)
ERYTHROCYTE [DISTWIDTH] IN BLOOD BY AUTOMATED COUNT: 13.4 % (ref 11.5–15)
GFR, ESTIMATED: >90 ML/MIN/1.73M2
GLUCOSE SERPL-MCNC: 149 MG/DL (ref 74–99)
HCT VFR BLD AUTO: 43.8 % (ref 34–48)
HGB BLD-MCNC: 14.3 G/DL (ref 11.5–15.5)
IMM GRANULOCYTES # BLD AUTO: 0.06 K/UL (ref 0–0.58)
IMM GRANULOCYTES NFR BLD: 1 % (ref 0–5)
LYMPHOCYTES NFR BLD: 0.97 K/UL (ref 1.5–4)
LYMPHOCYTES RELATIVE PERCENT: 9 % (ref 20–42)
MAGNESIUM SERPL-MCNC: 2.2 MG/DL (ref 1.6–2.6)
MCH RBC QN AUTO: 30.2 PG (ref 26–35)
MCHC RBC AUTO-ENTMCNC: 32.6 G/DL (ref 32–34.5)
MCV RBC AUTO: 92.4 FL (ref 80–99.9)
MICROORGANISM SPEC CULT: NORMAL
MONOCYTES NFR BLD: 0.27 K/UL (ref 0.1–0.95)
MONOCYTES NFR BLD: 3 % (ref 2–12)
NEUTROPHILS NFR BLD: 88 % (ref 43–80)
NEUTS SEG NFR BLD: 9.65 K/UL (ref 1.8–7.3)
PHOSPHATE SERPL-MCNC: 3.8 MG/DL (ref 2.5–4.5)
PLATELET # BLD AUTO: 242 K/UL (ref 130–450)
PMV BLD AUTO: 10.2 FL (ref 7–12)
POTASSIUM SERPL-SCNC: 4 MMOL/L (ref 3.5–5)
PROT SERPL-MCNC: 7.4 G/DL (ref 6.4–8.3)
RBC # BLD AUTO: 4.74 M/UL (ref 3.5–5.5)
SODIUM SERPL-SCNC: 141 MMOL/L (ref 132–146)
SPECIMEN DESCRIPTION: NORMAL
WBC OTHER # BLD: 11 K/UL (ref 4.5–11.5)

## 2024-05-09 PROCEDURE — 2580000003 HC RX 258: Performed by: NEUROLOGICAL SURGERY

## 2024-05-09 PROCEDURE — 80053 COMPREHEN METABOLIC PANEL: CPT

## 2024-05-09 PROCEDURE — 82330 ASSAY OF CALCIUM: CPT

## 2024-05-09 PROCEDURE — 97530 THERAPEUTIC ACTIVITIES: CPT

## 2024-05-09 PROCEDURE — 2000000000 HC ICU R&B

## 2024-05-09 PROCEDURE — 6370000000 HC RX 637 (ALT 250 FOR IP): Performed by: NEUROLOGICAL SURGERY

## 2024-05-09 PROCEDURE — 84100 ASSAY OF PHOSPHORUS: CPT

## 2024-05-09 PROCEDURE — 99291 CRITICAL CARE FIRST HOUR: CPT | Performed by: SURGERY

## 2024-05-09 PROCEDURE — APPSS30 APP SPLIT SHARED TIME 16-30 MINUTES: Performed by: NURSE PRACTITIONER

## 2024-05-09 PROCEDURE — 6360000002 HC RX W HCPCS

## 2024-05-09 PROCEDURE — 83735 ASSAY OF MAGNESIUM: CPT

## 2024-05-09 PROCEDURE — 85025 COMPLETE CBC W/AUTO DIFF WBC: CPT

## 2024-05-09 PROCEDURE — 97535 SELF CARE MNGMENT TRAINING: CPT

## 2024-05-09 RX ORDER — OXYCODONE HYDROCHLORIDE 5 MG/1
5 TABLET ORAL EVERY 4 HOURS PRN
Qty: 42 TABLET | Refills: 0 | Status: SHIPPED | OUTPATIENT
Start: 2024-05-09 | End: 2024-05-16

## 2024-05-09 RX ADMIN — PROCHLORPERAZINE EDISYLATE 10 MG: 5 INJECTION INTRAMUSCULAR; INTRAVENOUS at 04:32

## 2024-05-09 RX ADMIN — ROSUVASTATIN 10 MG: 10 TABLET, FILM COATED ORAL at 20:07

## 2024-05-09 RX ADMIN — ACETAMINOPHEN 650 MG: 325 TABLET ORAL at 23:13

## 2024-05-09 RX ADMIN — SODIUM CHLORIDE, PRESERVATIVE FREE 10 ML: 5 INJECTION INTRAVENOUS at 20:08

## 2024-05-09 RX ADMIN — ACETAMINOPHEN 650 MG: 325 TABLET ORAL at 04:36

## 2024-05-09 RX ADMIN — SODIUM CHLORIDE, PRESERVATIVE FREE 10 ML: 5 INJECTION INTRAVENOUS at 08:11

## 2024-05-09 ASSESSMENT — PAIN SCALES - GENERAL
PAINLEVEL_OUTOF10: 6
PAINLEVEL_OUTOF10: 5
PAINLEVEL_OUTOF10: 0

## 2024-05-09 ASSESSMENT — PAIN DESCRIPTION - DESCRIPTORS
DESCRIPTORS: DISCOMFORT;ACHING;THROBBING
DESCRIPTORS: ACHING

## 2024-05-09 ASSESSMENT — PAIN DESCRIPTION - LOCATION
LOCATION: HEAD
LOCATION: HEAD

## 2024-05-09 ASSESSMENT — PAIN - FUNCTIONAL ASSESSMENT: PAIN_FUNCTIONAL_ASSESSMENT: ACTIVITIES ARE NOT PREVENTED

## 2024-05-09 NOTE — PROGRESS NOTES
OCCUPATIONAL THERAPY TREATMENT NOTE    [facility]  OT BEDSIDE TREATMENT NOTE      Date:2024  Patient Name: Emili Esquivel  MRN: [id number]  : 1955  Room: [address] [place] [address]     Evaluating OT: Janis Eisenberg, OTR/L [id number]     Referring Provider:   Maximus Buckner MD       Specific Provider Orders/Date: OT eval and treat (24)        Diagnosis: NPH (normal pressure hydrocephalus) (HCC) [G91.2]          Surgery/Procedures:  Placement of LUMBAR DRAIN           Pertinent Medical History:    Past Medical History        Past Medical History:   Diagnosis Date    Arthritis      Cancer (HCC)       skin     Hyperlipemia               *Precautions:  Fall Risk, lumbar drain, alarms,   Assessment of current deficits   [x] Functional mobility          [x]ADLs           [x] Strength                  []Cognition   [x] Functional transfers        [x] IADLs         [x] Safety Awareness   [x]Endurance   [] Fine Coordination           [] ROM           [] Vision/perception    []Sensation     []Gross Motor Coordination [x] Balance    [] Delirium                  []Motor Control     [] Communication     OT PLAN OF CARE   OT POC based on physician orders, patient diagnosis and results of clinical assessment.        Frequency/Duration: 1-3 days/wk for 1-2 weeks PRN    Specific OT Treatment to include:   ADL retraining/adapted techniques and AE recommendations to increase functional independence within precautions                    Energy conservation techniques to improve tolerance for selfcare routine   Functional transfer/mobility training/DME recommendations for increased independence, safety and fall prevention         Patient/family education to increase safety and functional independence within precautions              Environmental modifications for safe mobility and completion of ADLs                           Therapeutic activity to improve functional performance during ADLs/IADLs          functional engagement throughout daily activities. At end of session, patient properly positioned seated in bedside chair w/ +chair alarm, call light within reach, all lines and tubes intact. Pt instructed on use of call light for assistance and fall prevention. Nursing notified of patient positioning.    Patient presents with decreased ROM/strength, activity tolerance, dynamic balance, functional mobility limiting completion of ADLs and safety.  Pt can benefit from continued skilled OT services to increase safety, functional independence and quality of life.     Pt has made fair progress towards set goals.   Continue with current plan of care      Treatment Time In:10:30a            Treatment Time Out: 10:53a                Treatment Charges: Mins Units   Ther Ex  72087     Manual Therapy 87569     Thera Activities 82614 8 1   ADL/Home Mgt 22478 15 1   Neuro Re-ed 73519     Group Therapy      Orthotic manage/training  82416     Non-Billable Time     Total Timed Treatment 23 2         Jordan Downey OTR/L; [id number]

## 2024-05-09 NOTE — CARE COORDINATION
Patient did well with therapy today, ambulated 75' x 2 without assistive device. Plan is home with spouse and no new needs when released.     For questions I can be reached at [phone]. CHAYA Berry
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
## 2024-05-09 NOTE — PROGRESS NOTES
[facility]  SURGICAL INTENSIVE CARE UNIT (SICU)  ATTENDING PHYSICIAN CRITICAL CARE PROGRESS NOTE     I have personally examined the patient, personally reviewed the record, and personally discussed the case with the resident. I have personally reviewed all relevant labs and imaging data. I am actively managing this patient's medications.  Please refer to the resident's note. I agree with the assessment and plan with the following corrections/additions. The following summarizes my clinical findings and independent assessment.     CC: critical care management after lumbar drain    Hospital Course/Overnight Events:  --underwent lumbar drain for NPH  --some nausea this AM  --no issues overnight    Pt states nausea is improved.  Up in chair.    Medications   Scheduled Meds:    scopolamine  1 patch TransDERmal Q72H    sodium chloride flush  5-40 mL IntraVENous 2 times per day    rosuvastatin  10 mg Oral Daily    sodium chloride flush  5-40 mL IntraVENous 2 times per day    polyethylene glycol  17 g Oral Daily    bisacodyl  5 mg Oral Daily    sennosides-docusate sodium  1 tablet Oral BID     Continuous Infusions:    sodium chloride       PRN Meds: prochlorperazine, sodium chloride flush, sodium chloride, ondansetron **OR** ondansetron, acetaminophen, oxyCODONE **OR** oxyCODONE, labetalol    Physical Examination      Vitals:  BP 90/76   Pulse 89   Temp 99 °F (37.2 °C) (Temporal)   Resp 18   Ht 1.676 m (5' 6\")   Wt 73.6 kg (162 lb 4.1 oz)   SpO2 97%   BMI 26.19 kg/m²   Temp (24hrs), Av.4 °F (36.9 °C), Min:97.9 °F (36.6 °C), Max:99 °F (37.2 °C)      I/O (24Hr):    Intake/Output Summary (Last 24 hours) at 2024 1121  Last data filed at 2024 0800  Gross per 24 hour   Intake --   Output 1330 ml   Net -1330 ml         Physical Exam  Constitutional:       Comments: Awake and alert  Follows commands   Cardiovascular:      Rate and Rhythm: Normal rate and regular rhythm.      Heart

## 2024-05-09 NOTE — PROGRESS NOTES
Patient transferred from SICU via bed. Has all belongings.  L lumbar drain intact. Dressing with old drainage, marked, and drainage remains inside marked area. Patient is alert and oriented. Denies any pain. Skin assessment completed. Oriented to room, call light, and safety precautions, and the need to call for assistance when needed. Bed in lowest position and bed alarm activated.

## 2024-05-09 NOTE — ACP (ADVANCE CARE PLANNING)
Advance Care Planning   Healthcare Decision Maker:    Primary Decision Maker: Floyd Esquivel [facility] - [phone]    Click here to complete Healthcare Decision Makers including selection of the Healthcare Decision Maker Relationship (ie \"Primary\").

## 2024-05-09 NOTE — PROGRESS NOTES
Neuro Science Intensive Care Unit  Critical Care  Daily Progress Note 5/9/2024    Date of Admission: 05/07/2024     CC: Follow up for lumbar drain placement     HOSPITAL COURSE/OVERNIGHT EVENTS:  67 yo woman admitted post operatively to SICU for Neuro ICU after placement of lumbar drain.  Had nausea & vomiting with motion sickness.  She also admitted to occasional incontinence,  Work up identified ventriculomegaly.    5/8  No issues overnight.  Nauseated this am but this is her baseline with nausea in the morning.     5/9  No issues overnight. Required 1 dose antihypertensive.  Denies any nausea.      PHYSICAL EXAM:   /69   Pulse 80   Temp 98.5 °F (36.9 °C) (Temporal)   Resp 16   Ht 1.676 m (5' 6\")   Wt 73.6 kg (162 lb 4.1 oz)   SpO2 98%   BMI 26.19 kg/m²     Intake/Output Summary (Last 24 hours) at 5/9/2024 0640  Last data filed at 5/9/2024 0600  Gross per 24 hour   Intake --   Output 1350 ml   Net -1350 ml      General appearance:  Comfortable.   Pain Description: none    NEUROLOGIC:   RASS Score:  0  GCS:  15  4 - Opens eyes on own   6 - Follows simple motor commands  5 - Alert and oriented       Pupil size:  Left 3 mm  Right 3 mm  Pupil reaction: Yes   PERRLA  Wiggles fingers: Left   Yes  Right Yes  Hand grasp:   Left: normal.   Right  normal  Wiggles toes: Left   Yes Right   Yes   Plantar flexion:  Left  normal Right   normal    Lumbar drainage:  clear  Previous 8 hour: 170 ml  Previous 24 hour:  260 ml    CONSTITUTIONAL: No acute distress, lying in hospital bed.    CARDIOVASCULAR: Monitor: NSR.  S1 S2.  Regular rate, regular rhythm.  .  No murmur/gallop/rub.  PULMONARY: Bilateral clear breath sounds   No rhonchi/rales/wheezes, no use of accessory muscles.  O2: Room air.     RENAL: Voids  Fluid balance previous 24 hours:  - 1.3 L. .    ABDOMEN: Soft, nontender, nondistended, nontympanic, normal bowel sounds. Diet:  Regular.  No reported nausea or vomiting.  Last BM:  5/8.    MUSCULOSKELETAL:  Moves

## 2024-05-09 NOTE — PLAN OF CARE
Problem: Safety - Adult  Goal: Free from fall injury  5/9/2024 0711 by Himanshu Hinton, RN  Outcome: Progressing     Problem: Chronic Conditions and Co-morbidities  Goal: Patient's chronic conditions and co-morbidity symptoms are monitored and maintained or improved  5/9/2024 0711 by Himanshu Hinton, RN  Outcome: Progressing  Flowsheets (Taken 5/9/2024 0711)  Care Plan - Patient's Chronic Conditions and Co-Morbidity Symptoms are Monitored and Maintained or Improved:   Monitor and assess patient's chronic conditions and comorbid symptoms for stability, deterioration, or improvement   Collaborate with multidisciplinary team to address chronic and comorbid conditions and prevent exacerbation or deterioration     Problem: Metabolic/Fluid and Electrolytes - Adult  Goal: Electrolytes maintained within normal limits  Outcome: Progressing  Flowsheets (Taken 5/9/2024 0711)  Electrolytes maintained within normal limits:   Monitor labs and assess patient for signs and symptoms of electrolyte imbalances   Monitor response to electrolyte replacements, including repeat lab results as appropriate     Problem: Hematologic - Adult  Goal: Maintains hematologic stability  Outcome: Progressing  Flowsheets (Taken 5/9/2024 0711)  Maintains hematologic stability:   Assess for signs and symptoms of bleeding or hemorrhage   Monitor labs for bleeding or clotting disorders

## 2024-05-09 NOTE — PROGRESS NOTES
Department of Neurosurgery  Progress Note    CHIEF COMPLAINT: s/p lumbar drain for possible NPH    SUBJECTIVE:  Sitting in bed, no new complaints.     REVIEW OF SYSTEMS :  Constitutional: Negative for chills and fever.    Neurological: Negative for dizziness, tremors and speech change.     OBJECTIVE:   VITALS:  /74   Pulse 84   Temp 99 °F (37.2 °C) (Temporal)   Resp 23   Ht 1.676 m (5' 6\")   Wt 73.6 kg (162 lb 4.1 oz)   SpO2 97%   BMI 26.19 kg/m²     PHYSICAL:  CONSTITUTIONAL:  awake, alert, cooperative, no apparent distress, and appears stated age.  CATHY ARAIZA.  Recent memory 3/3    DATA:  CBC:   Lab Results   Component Value Date/Time    WBC 11.0 05/09/2024 03:10 AM    RBC 4.74 05/09/2024 03:10 AM    HGB 14.3 05/09/2024 03:10 AM    HCT 43.8 05/09/2024 03:10 AM    MCV 92.4 05/09/2024 03:10 AM    MCH 30.2 05/09/2024 03:10 AM    MCHC 32.6 05/09/2024 03:10 AM    RDW 13.4 05/09/2024 03:10 AM     05/09/2024 03:10 AM    MPV 10.2 05/09/2024 03:10 AM     BMP:    Lab Results   Component Value Date/Time     05/09/2024 03:10 AM    K 4.0 05/09/2024 03:10 AM     05/09/2024 03:10 AM    CO2 19 05/09/2024 03:10 AM    BUN 16 05/09/2024 03:10 AM    CREATININE 0.7 05/09/2024 03:10 AM    CALCIUM 9.9 05/09/2024 03:10 AM    LABGLOM >90 05/09/2024 03:10 AM    LABGLOM >90 03/31/2024 06:09 AM    GLUCOSE 149 05/09/2024 03:10 AM     PT/INR:  No results found for: \"PROTIME\", \"INR\"  PTT:  No results found for: \"APTT\"[APTT}    Current Inpatient Medications  Current Facility-Administered Medications: prochlorperazine (COMPAZINE) injection 10 mg, 10 mg, IntraVENous, Q6H PRN  scopolamine (TRANSDERM-SCOP) transdermal patch 1 patch, 1 patch, TransDERmal, Q72H  sodium chloride flush 0.9 % injection 5-40 mL, 5-40 mL, IntraVENous, 2 times per day  rosuvastatin (CRESTOR) tablet 10 mg, 10 mg, Oral, Daily  sodium chloride flush 0.9 % injection 5-40 mL, 5-40 mL, IntraVENous, 2 times per day  sodium chloride flush 0.9 %

## 2024-05-09 NOTE — PLAN OF CARE
Problem: Discharge Planning  Goal: Discharge to home or other facility with appropriate resources  5/9/2024 0851 by Robert Storm RN  Outcome: Progressing  5/9/2024 0711 by Himanshu Hinton RN  Outcome: Progressing  Flowsheets (Taken 5/9/2024 0711)  Discharge to home or other facility with appropriate resources:   Identify barriers to discharge with patient and caregiver   Identify discharge learning needs (meds, wound care, etc)  5/8/2024 2007 by Christian Hicks RN  Outcome: Progressing  Flowsheets (Taken 5/8/2024 2000)  Discharge to home or other facility with appropriate resources: Identify barriers to discharge with patient and caregiver     Problem: Safety - Adult  Goal: Free from fall injury  5/9/2024 0851 by Robert Storm RN  Outcome: Progressing  5/9/2024 0711 by Himanshu Hinton RN  Outcome: Progressing  5/8/2024 2007 by Christian Hicks RN  Outcome: Progressing  Flowsheets (Taken 5/8/2024 2000)  Free From Fall Injury: Instruct family/caregiver on patient safety     Problem: ABCDS Injury Assessment  Goal: Absence of physical injury  5/9/2024 0851 by Robert Storm RN  Outcome: Progressing  5/9/2024 0711 by Himanshu Hinton RN  Outcome: Progressing  Flowsheets (Taken 5/9/2024 0711)  Absence of Physical Injury: Implement safety measures based on patient assessment  5/8/2024 2007 by Christian Hicks RN  Outcome: Progressing     Problem: Chronic Conditions and Co-morbidities  Goal: Patient's chronic conditions and co-morbidity symptoms are monitored and maintained or improved  5/9/2024 0851 by Robert Storm RN  Outcome: Progressing  5/9/2024 0711 by Himanshu Hinton RN  Outcome: Progressing  Flowsheets (Taken 5/9/2024 0711)  Care Plan - Patient's Chronic Conditions and Co-Morbidity Symptoms are Monitored and Maintained or Improved:   Monitor and assess patient's chronic conditions and comorbid symptoms for stability, deterioration, or improvement   Collaborate with multidisciplinary  team to address chronic and comorbid conditions and prevent exacerbation or deterioration  5/8/2024 2007 by Christian Hicks RN  Outcome: Progressing  Flowsheets (Taken 5/8/2024 2000)  Care Plan - Patient's Chronic Conditions and Co-Morbidity Symptoms are Monitored and Maintained or Improved: Monitor and assess patient's chronic conditions and comorbid symptoms for stability, deterioration, or improvement     Problem: Pain  Goal: Verbalizes/displays adequate comfort level or baseline comfort level  5/9/2024 0851 by Robert Storm RN  Outcome: Progressing  5/9/2024 0711 by Himanshu Hinton RN  Outcome: Progressing  Flowsheets (Taken 5/9/2024 0711)  Verbalizes/displays adequate comfort level or baseline comfort level:   Encourage patient to monitor pain and request assistance   Assess pain using appropriate pain scale   Administer analgesics based on type and severity of pain and evaluate response   Implement non-pharmacological measures as appropriate and evaluate response  5/8/2024 2007 by Christian Hicks RN  Outcome: Progressing  Flowsheets (Taken 5/8/2024 2000)  Verbalizes/displays adequate comfort level or baseline comfort level: Encourage patient to monitor pain and request assistance     Problem: Skin/Tissue Integrity  Goal: Absence of new skin breakdown  Description: 1.  Monitor for areas of redness and/or skin breakdown  2.  Assess vascular access sites hourly  3.  Every 4-6 hours minimum:  Change oxygen saturation probe site  4.  Every 4-6 hours:  If on nasal continuous positive airway pressure, respiratory therapy assess nares and determine need for appliance change or resting period.  5/9/2024 0851 by Robert Storm RN  Outcome: Progressing  5/8/2024 2007 by Christian Hicks, RN  Outcome: Progressing     Problem: Neurosensory - Adult  Goal: Achieves maximal functionality and self care  5/9/2024 0851 by Robert Storm RN  Outcome: Progressing  5/8/2024 2007 by Christian Hicks, RN  Outcome:

## 2024-05-09 NOTE — PLAN OF CARE
Problem: Discharge Planning  Goal: Discharge to home or other facility with appropriate resources  Outcome: Progressing  Flowsheets (Taken 5/8/2024 2000)  Discharge to home or other facility with appropriate resources: Identify barriers to discharge with patient and caregiver     Problem: Safety - Adult  Goal: Free from fall injury  Outcome: Progressing  Flowsheets (Taken 5/8/2024 2000)  Free From Fall Injury: Instruct family/caregiver on patient safety     Problem: ABCDS Injury Assessment  Goal: Absence of physical injury  Outcome: Progressing     Problem: Chronic Conditions and Co-morbidities  Goal: Patient's chronic conditions and co-morbidity symptoms are monitored and maintained or improved  Outcome: Progressing  Flowsheets (Taken 5/8/2024 2000)  Care Plan - Patient's Chronic Conditions and Co-Morbidity Symptoms are Monitored and Maintained or Improved: Monitor and assess patient's chronic conditions and comorbid symptoms for stability, deterioration, or improvement     Problem: Pain  Goal: Verbalizes/displays adequate comfort level or baseline comfort level  Outcome: Progressing  Flowsheets (Taken 5/8/2024 2000)  Verbalizes/displays adequate comfort level or baseline comfort level: Encourage patient to monitor pain and request assistance     Problem: Skin/Tissue Integrity  Goal: Absence of new skin breakdown  Description: 1.  Monitor for areas of redness and/or skin breakdown  2.  Assess vascular access sites hourly  3.  Every 4-6 hours minimum:  Change oxygen saturation probe site  4.  Every 4-6 hours:  If on nasal continuous positive airway pressure, respiratory therapy assess nares and determine need for appliance change or resting period.  Outcome: Progressing     Problem: Neurosensory - Adult  Goal: Achieves maximal functionality and self care  Outcome: Progressing  Flowsheets (Taken 5/8/2024 2000)  Achieves maximal functionality and self care: Monitor swallowing and airway patency with patient fatigue

## 2024-05-09 NOTE — PROGRESS NOTES
.Patient admitted to NSICU with the following belongings:  Glasses, Cell Phone, Cell Phone , Pants, Shirt, and Shoes. The following belongings admitted with the patient and at bedside.     4 Eyes Skin Assessment     NAME:  Emili Esquivel  YOB: 1955  MEDICAL RECORD NUMBER:  [id number]    The patient is being assessed for  Transfer to New Unit    I agree that at least one RN has performed a thorough Head to Toe Skin Assessment on the patient. ALL assessment sites listed below have been assessed.      Areas assessed by both nurses:    Head, Face, Ears, Shoulders, Back, Chest, Arms, Elbows, Hands, Sacrum. Buttock, Coccyx, Ischium, and Legs. Feet and Heels        Does the Patient have a Wound? No noted wound(s)     L scapula ecchymosis     L hand abrasion     L lower lumbar surgical incision, unable    to view. Dressing intact, with old drainage.     Guillermo Prevention initiated by RN: Yes  Wound Care Orders initiated by RN: No    Pressure Injury (Stage 3,4, Unstageable, DTI, NWPT, and Complex wounds) if present, place Wound referral order by RN under : No    New Ostomies, if present place, Ostomy referral order under : No     Nurse 1 eSignature: Electronically signed by Himanshu Hinton RN on 5/8/24 at 11:04 PM EDT    **SHARE this note so that the co-signing nurse can place an eSignature**    Nurse 2 eSignature: Electronically signed by Dakota Olvera RN on 5/8/24 at 11:06 PM EDT

## 2024-05-10 VITALS
OXYGEN SATURATION: 93 % | HEIGHT: 66 IN | BODY MASS INDEX: 26.08 KG/M2 | WEIGHT: 162.26 LBS | SYSTOLIC BLOOD PRESSURE: 119 MMHG | DIASTOLIC BLOOD PRESSURE: 73 MMHG | HEART RATE: 73 BPM | TEMPERATURE: 97.8 F | RESPIRATION RATE: 21 BRPM

## 2024-05-10 LAB
ALBUMIN SERPL-MCNC: 4.4 G/DL (ref 3.5–5.2)
ALP SERPL-CCNC: 78 U/L (ref 35–104)
ALT SERPL-CCNC: 14 U/L (ref 0–32)
ANION GAP SERPL CALCULATED.3IONS-SCNC: 14 MMOL/L (ref 7–16)
AST SERPL-CCNC: 14 U/L (ref 0–31)
BASOPHILS # BLD: 0.05 K/UL (ref 0–0.2)
BASOPHILS NFR BLD: 1 % (ref 0–2)
BILIRUB SERPL-MCNC: 0.7 MG/DL (ref 0–1.2)
BUN SERPL-MCNC: 14 MG/DL (ref 6–23)
CA-I BLD-SCNC: 1.15 MMOL/L (ref 1.15–1.33)
CALCIUM SERPL-MCNC: 9.5 MG/DL (ref 8.6–10.2)
CHLORIDE SERPL-SCNC: 100 MMOL/L (ref 98–107)
CO2 SERPL-SCNC: 24 MMOL/L (ref 22–29)
CREAT SERPL-MCNC: 0.7 MG/DL (ref 0.5–1)
EOSINOPHIL # BLD: 0.08 K/UL (ref 0.05–0.5)
EOSINOPHILS RELATIVE PERCENT: 1 % (ref 0–6)
ERYTHROCYTE [DISTWIDTH] IN BLOOD BY AUTOMATED COUNT: 13.5 % (ref 11.5–15)
GFR, ESTIMATED: >90 ML/MIN/1.73M2
GLUCOSE SERPL-MCNC: 113 MG/DL (ref 74–99)
HCT VFR BLD AUTO: 40.8 % (ref 34–48)
HGB BLD-MCNC: 13.7 G/DL (ref 11.5–15.5)
IMM GRANULOCYTES # BLD AUTO: 0.04 K/UL (ref 0–0.58)
IMM GRANULOCYTES NFR BLD: 0 % (ref 0–5)
LYMPHOCYTES NFR BLD: 2.52 K/UL (ref 1.5–4)
LYMPHOCYTES RELATIVE PERCENT: 26 % (ref 20–42)
MAGNESIUM SERPL-MCNC: 2.2 MG/DL (ref 1.6–2.6)
MCH RBC QN AUTO: 30.8 PG (ref 26–35)
MCHC RBC AUTO-ENTMCNC: 33.6 G/DL (ref 32–34.5)
MCV RBC AUTO: 91.7 FL (ref 80–99.9)
MONOCYTES NFR BLD: 0.45 K/UL (ref 0.1–0.95)
MONOCYTES NFR BLD: 5 % (ref 2–12)
NEUTROPHILS NFR BLD: 68 % (ref 43–80)
NEUTS SEG NFR BLD: 6.55 K/UL (ref 1.8–7.3)
PHOSPHATE SERPL-MCNC: 2.8 MG/DL (ref 2.5–4.5)
PLATELET # BLD AUTO: 214 K/UL (ref 130–450)
PMV BLD AUTO: 10.3 FL (ref 7–12)
POTASSIUM SERPL-SCNC: 3.7 MMOL/L (ref 3.5–5)
PROT SERPL-MCNC: 7.2 G/DL (ref 6.4–8.3)
RBC # BLD AUTO: 4.45 M/UL (ref 3.5–5.5)
SODIUM SERPL-SCNC: 138 MMOL/L (ref 132–146)
WBC OTHER # BLD: 9.7 K/UL (ref 4.5–11.5)

## 2024-05-10 PROCEDURE — 84100 ASSAY OF PHOSPHORUS: CPT

## 2024-05-10 PROCEDURE — 99231 SBSQ HOSP IP/OBS SF/LOW 25: CPT | Performed by: NURSE PRACTITIONER

## 2024-05-10 PROCEDURE — 6370000000 HC RX 637 (ALT 250 FOR IP): Performed by: NEUROLOGICAL SURGERY

## 2024-05-10 PROCEDURE — 97530 THERAPEUTIC ACTIVITIES: CPT

## 2024-05-10 PROCEDURE — 83735 ASSAY OF MAGNESIUM: CPT

## 2024-05-10 PROCEDURE — 2580000003 HC RX 258: Performed by: NEUROLOGICAL SURGERY

## 2024-05-10 PROCEDURE — 85025 COMPLETE CBC W/AUTO DIFF WBC: CPT

## 2024-05-10 PROCEDURE — 6360000002 HC RX W HCPCS

## 2024-05-10 PROCEDURE — 82330 ASSAY OF CALCIUM: CPT

## 2024-05-10 PROCEDURE — 80053 COMPREHEN METABOLIC PANEL: CPT

## 2024-05-10 RX ADMIN — ONDANSETRON 4 MG: 4 TABLET, ORALLY DISINTEGRATING ORAL at 09:21

## 2024-05-10 RX ADMIN — SODIUM CHLORIDE, PRESERVATIVE FREE 10 ML: 5 INJECTION INTRAVENOUS at 07:49

## 2024-05-10 RX ADMIN — ACETAMINOPHEN 650 MG: 325 TABLET ORAL at 09:19

## 2024-05-10 RX ADMIN — PROCHLORPERAZINE EDISYLATE 10 MG: 5 INJECTION INTRAMUSCULAR; INTRAVENOUS at 12:33

## 2024-05-10 RX ADMIN — ONDANSETRON 4 MG: 4 TABLET, ORALLY DISINTEGRATING ORAL at 17:13

## 2024-05-10 ASSESSMENT — PAIN SCALES - GENERAL
PAINLEVEL_OUTOF10: 3
PAINLEVEL_OUTOF10: 0

## 2024-05-10 ASSESSMENT — PAIN DESCRIPTION - DESCRIPTORS: DESCRIPTORS: ACHING;SORE;DULL

## 2024-05-10 ASSESSMENT — PAIN DESCRIPTION - ORIENTATION: ORIENTATION: MID

## 2024-05-10 ASSESSMENT — PAIN DESCRIPTION - PAIN TYPE: TYPE: ACUTE PAIN

## 2024-05-10 ASSESSMENT — PAIN DESCRIPTION - ONSET: ONSET: GRADUAL

## 2024-05-10 ASSESSMENT — PAIN DESCRIPTION - FREQUENCY: FREQUENCY: INTERMITTENT

## 2024-05-10 ASSESSMENT — PAIN DESCRIPTION - LOCATION: LOCATION: HEAD

## 2024-05-10 ASSESSMENT — PAIN - FUNCTIONAL ASSESSMENT: PAIN_FUNCTIONAL_ASSESSMENT: ACTIVITIES ARE NOT PREVENTED

## 2024-05-10 NOTE — PROGRESS NOTES
CLINICAL PHARMACY NOTE: MEDS TO BEDS    Total # of Prescriptions Filled: 1   The following medications were delivered to the patient:  Oxycodone 5 mg    Additional Documentation:    picked up in the pharmacy

## 2024-05-10 NOTE — PLAN OF CARE
Problem: Discharge Planning  Goal: Discharge to home or other facility with appropriate resources  5/10/2024 0839 by Robert Storm RN  Outcome: Progressing  5/9/2024 2203 by Mickey Dahl RN  Outcome: Progressing     Problem: Safety - Adult  Goal: Free from fall injury  5/10/2024 0839 by Robert Storm RN  Outcome: Progressing  5/9/2024 2203 by Mickey Dahl RN  Outcome: Progressing     Problem: ABCDS Injury Assessment  Goal: Absence of physical injury  Outcome: Progressing     Problem: Chronic Conditions and Co-morbidities  Goal: Patient's chronic conditions and co-morbidity symptoms are monitored and maintained or improved  Outcome: Progressing     Problem: Pain  Goal: Verbalizes/displays adequate comfort level or baseline comfort level  Outcome: Progressing     Problem: Skin/Tissue Integrity  Goal: Absence of new skin breakdown  Description: 1.  Monitor for areas of redness and/or skin breakdown  2.  Assess vascular access sites hourly  3.  Every 4-6 hours minimum:  Change oxygen saturation probe site  4.  Every 4-6 hours:  If on nasal continuous positive airway pressure, respiratory therapy assess nares and determine need for appliance change or resting period.  Outcome: Progressing     Problem: Neurosensory - Adult  Goal: Achieves maximal functionality and self care  Outcome: Progressing     Problem: Respiratory - Adult  Goal: Achieves optimal ventilation and oxygenation  Outcome: Progressing     Problem: Cardiovascular - Adult  Goal: Maintains optimal cardiac output and hemodynamic stability  Outcome: Progressing     Problem: Skin/Tissue Integrity - Adult  Goal: Incisions, wounds, or drain sites healing without S/S of infection  Outcome: Progressing     Problem: Musculoskeletal - Adult  Goal: Return ADL status to a safe level of function  Outcome: Progressing     Problem: Gastrointestinal - Adult  Goal: Maintains or returns to baseline bowel function  Outcome: Progressing     Problem: Genitourinary -

## 2024-05-10 NOTE — PLAN OF CARE
Problem: Discharge Planning  Goal: Discharge to home or other facility with appropriate resources  5/10/2024 1821 by Robert Storm RN  Outcome: Completed  5/10/2024 0839 by Robert Storm RN  Outcome: Progressing     Problem: Safety - Adult  Goal: Free from fall injury  5/10/2024 1821 by Robert Storm RN  Outcome: Completed  5/10/2024 0839 by Robert Storm RN  Outcome: Progressing     Problem: ABCDS Injury Assessment  Goal: Absence of physical injury  5/10/2024 1821 by Robert Storm RN  Outcome: Completed  5/10/2024 0839 by Robert Storm RN  Outcome: Progressing     Problem: Chronic Conditions and Co-morbidities  Goal: Patient's chronic conditions and co-morbidity symptoms are monitored and maintained or improved  5/10/2024 1821 by Robert Storm RN  Outcome: Completed  5/10/2024 0839 by Robert Storm RN  Outcome: Progressing     Problem: Pain  Goal: Verbalizes/displays adequate comfort level or baseline comfort level  5/10/2024 1821 by Robert Storm RN  Outcome: Completed  5/10/2024 0839 by Robert Storm RN  Outcome: Progressing     Problem: Skin/Tissue Integrity  Goal: Absence of new skin breakdown  Description: 1.  Monitor for areas of redness and/or skin breakdown  2.  Assess vascular access sites hourly  3.  Every 4-6 hours minimum:  Change oxygen saturation probe site  4.  Every 4-6 hours:  If on nasal continuous positive airway pressure, respiratory therapy assess nares and determine need for appliance change or resting period.  5/10/2024 1821 by Robert Storm RN  Outcome: Completed  5/10/2024 0839 by Robert Storm RN  Outcome: Progressing     Problem: Neurosensory - Adult  Goal: Achieves maximal functionality and self care  5/10/2024 1821 by Robert Storm RN  Outcome: Completed  5/10/2024 0839 by Robert Storm RN  Outcome: Progressing     Problem: Respiratory - Adult  Goal: Achieves optimal ventilation and oxygenation  5/10/2024 1821 by Robert Storm RN  Outcome:

## 2024-05-10 NOTE — PROGRESS NOTES
OCCUPATIONAL THERAPY TREATMENT NOTE    [facility]  OT BEDSIDE TREATMENT NOTE      Date:5/10/2024  Patient Name: Emili Esquivel  MRN: [id number]  : 1955  Room: [address] [place] [address]     Evaluating OT: Janis Eisenberg, OTR/L [id number]     Referring Provider:   Maximus Buckner MD       Specific Provider Orders/Date: OT eval and treat (24)        Diagnosis: NPH (normal pressure hydrocephalus) (HCC) [G91.2]          Surgery/Procedures:  Placement of LUMBAR DRAIN           Pertinent Medical History:    Past Medical History        Past Medical History:   Diagnosis Date    Arthritis      Cancer (HCC)       skin     Hyperlipemia               *Precautions:  Fall Risk, lumbar drain, alarms, -wear shoes for transfers/functional mobility     Assessment of current deficits   [x] Functional mobility          [x]ADLs           [x] Strength                  []Cognition   [x] Functional transfers        [x] IADLs         [x] Safety Awareness   [x]Endurance   [] Fine Coordination           [] ROM           [] Vision/perception    []Sensation     []Gross Motor Coordination [x] Balance    [] Delirium                  []Motor Control     [] Communication     OT PLAN OF CARE   OT POC based on physician orders, patient diagnosis and results of clinical assessment.        Frequency/Duration: 1-3 days/wk for 1-2 weeks PRN    Specific OT Treatment to include:   ADL retraining/adapted techniques and AE recommendations to increase functional independence within precautions                    Energy conservation techniques to improve tolerance for selfcare routine   Functional transfer/mobility training/DME recommendations for increased independence, safety and fall prevention         Patient/family education to increase safety and functional independence within precautions              Environmental modifications for safe mobility and completion of ADLs                           Therapeutic activity to improve

## 2024-05-10 NOTE — PLAN OF CARE
Problem: Discharge Planning  Goal: Discharge to home or other facility with appropriate resources  5/9/2024 2203 by Mickey Dahl, RN  Outcome: Progressing  5/9/2024 0851 by Robert Storm, RN  Outcome: Progressing     Problem: Safety - Adult  Goal: Free from fall injury  5/9/2024 2203 by Mickey Dahl, RN  Outcome: Progressing  5/9/2024 0851 by Robert Storm, RN  Outcome: Progressing

## 2024-05-10 NOTE — PROGRESS NOTES
Department of Neurosurgery  Progress Note    CHIEF COMPLAINT: s/p lumbar drain for possible NPH    SUBJECTIVE:  Lumbar drain removed at bedside without complications. No new issues overnight.     REVIEW OF SYSTEMS :  Constitutional: Negative for chills and fever.    Neurological: Negative for dizziness, tremors and speech change.     OBJECTIVE:   VITALS:  /73   Pulse 73   Temp 97.8 °F (36.6 °C) (Temporal)   Resp 21   Ht 1.676 m (5' 6\")   Wt 73.6 kg (162 lb 4.1 oz)   SpO2 93%   BMI 26.19 kg/m²     PHYSICAL:  CONSTITUTIONAL:  awake, alert, cooperative, no apparent distress, and appears stated age.  ARAIZA, FC.  Recent memory 3/3    DATA:  CBC:   Lab Results   Component Value Date/Time    WBC 9.7 05/10/2024 04:55 AM    RBC 4.45 05/10/2024 04:55 AM    HGB 13.7 05/10/2024 04:55 AM    HCT 40.8 05/10/2024 04:55 AM    MCV 91.7 05/10/2024 04:55 AM    MCH 30.8 05/10/2024 04:55 AM    MCHC 33.6 05/10/2024 04:55 AM    RDW 13.5 05/10/2024 04:55 AM     05/10/2024 04:55 AM    MPV 10.3 05/10/2024 04:55 AM     BMP:    Lab Results   Component Value Date/Time     05/10/2024 04:55 AM    K 3.7 05/10/2024 04:55 AM     05/10/2024 04:55 AM    CO2 24 05/10/2024 04:55 AM    BUN 14 05/10/2024 04:55 AM    CREATININE 0.7 05/10/2024 04:55 AM    CALCIUM 9.5 05/10/2024 04:55 AM    LABGLOM >90 05/10/2024 04:55 AM    LABGLOM >90 03/31/2024 06:09 AM    GLUCOSE 113 05/10/2024 04:55 AM     PT/INR:  No results found for: \"PROTIME\", \"INR\"  PTT:  No results found for: \"APTT\"[APTT}    Current Inpatient Medications  Current Facility-Administered Medications: prochlorperazine (COMPAZINE) injection 10 mg, 10 mg, IntraVENous, Q6H PRN  scopolamine (TRANSDERM-SCOP) transdermal patch 1 patch, 1 patch, TransDERmal, Q72H  sodium chloride flush 0.9 % injection 5-40 mL, 5-40 mL, IntraVENous, 2 times per day  rosuvastatin (CRESTOR) tablet 10 mg, 10 mg, Oral, Daily  sodium chloride flush 0.9 % injection 5-40 mL, 5-40 mL, IntraVENous, 2 times

## 2024-05-10 NOTE — CARE COORDINATION
Spoke with patient, she confirmed with me again today that she plans to return home with spouse when released. Discussed homecare and patient declined. Plan is home with no needs.     For questions I can be reached at [phone]. CHAYA Berry

## 2024-05-10 NOTE — PROGRESS NOTES
Intensive Care Unit  Critical Care Consult  Daily Progress Note 5/10/2024    Date of Admission: 5/7    CC: lumbar drain     EVENTS:   5/7--underwent lumbar drain for NPH  5/8--some nausea this AM  5/9--no issues overnight  5/10- no acute events, drain to be removed today     PHYSICAL EXAM:    /68   Pulse 89   Temp 98.1 °F (36.7 °C) (Temporal)   Resp 19   Ht 1.676 m (5' 6\")   Wt 73.6 kg (162 lb 4.1 oz)   SpO2 93%   BMI 26.19 kg/m²     General appearance:  Comfortable.     Pain Description: none    No deficits     CONSTITUTIONAL: no acute distress, lying in hospital bed,   NEUROLOGIC: PERRL, oriented x 4, drain intact  CARDIOVASCULAR: S1 S2, regular rate, regular rhythm, no murmur/gallop/rub. Monitor: sinus  PULMONARY: no rhonchi/rales/wheezes, no use of accessory muscles  RENAL: voiding  ABDOMEN: soft, nontender, nondistended, nontympanic, normal bowel sounds   MUSCULOSKELETAL: moves all extremities purposefully, 5/5 strength   SKIN/EXTREMITIES: no rashes/ecchymosis, no edema/clubbing, warm/dry, good capillary refill       Past Medical History:   Diagnosis Date    Arthritis     Cancer (HCC)     skin     Hyperlipemia        ASSESSMENT/PLAN:       Neuro:  Lumbar drain for NPH.  Monitor neuro status, Neurosurgery following, drain 10 cc per hour  CV: No acute issues.  Monitor hemodynamics.   Pulm: No acute issues.  Monitor RR & SpO2.    Pulmoanry hygiene    GI: No acute issues.  Diet. Monitor bowel function.   Renal: No acute issues. Monitor BUN & Cr. Monitor electrolytes & replace as needed.  Monitor urine output.   ID: No acute issues   Endocrine: No acute issues. Monitor BS.   MSK: No acute issues.  PT/OT  Heme: No acute issues.  Monitor CBC.      Bowel regime: Dulcolax, Glycolax  Pain control/Sedation: oxycodone, Tylenol  DVT prophylaxis: SCDs.    GI prophylaxis: diet  Mouth/Eye care: as needed   Family update: Questions answered for patient   Code status:  full  Disposition:  ICU      Total  time: 25  minutes     Electronically signed by FAISAL Hernadez CNP on 5/10/2024 at 9:33 AM
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
## 2024-05-15 NOTE — DISCHARGE SUMMARY
Neurosurgery Surgery Discharge Summary    [facility] SUMMARY:                The patient is a 68 y.o. female who was admitted to the hospital on 5/7/2024  9:16 AM for treatment of normal pressure hydrocephalus.   On the day of admission, a placement of lumbar drain was performed.  The patient's hospital course was uncomplicated and consisted of physical therapy, incision observation, and a return to normal oral intake.  The patient was discharged on 5/10/2024  6:47 PM tolerating a diet, moving bowels, and urinating without difficulty. The incisions were clean and intact.  The patient was discharged home in satisfactory condition with instructions to call the office for a follow up appointment.      Hospital Problem List:  Principal Problem:    NPH (normal pressure hydrocephalus) (HCC)  Resolved Problems:    * No resolved hospital problems. *     Procedure(s) (LRB):  Placement of LUMBAR DRAIN (N/A)    Discharge Medications:   Discharge Medication List as of 5/10/2024  6:23 PM        START taking these medications    Details   oxyCODONE (ROXICODONE) 5 MG immediate release tablet Take 1 tablet by mouth every 4 hours as needed for Pain for up to 7 days. Max Daily Amount: 30 mg, Disp-42 tablet, R-0Normal           CONTINUE these medications which have NOT CHANGED    Details   meclizine (ANTIVERT) 25 MG tablet Take 1 tablet by mouth 3 times daily as needed for DizzinessHistorical Med      rosuvastatin (CRESTOR) 10 MG tablet Take 1 tablet by mouth dailyHistorical Med             Carrol Flores PA-C  5/15/2024

## 2024-05-22 ENCOUNTER — OFFICE VISIT (OUTPATIENT)
Dept: NEUROSURGERY | Age: 69
End: 2024-05-22
Payer: MEDICARE

## 2024-05-22 VITALS
DIASTOLIC BLOOD PRESSURE: 73 MMHG | SYSTOLIC BLOOD PRESSURE: 119 MMHG | BODY MASS INDEX: 26.03 KG/M2 | RESPIRATION RATE: 18 BRPM | TEMPERATURE: 98 F | WEIGHT: 162 LBS | HEIGHT: 66 IN | OXYGEN SATURATION: 98 % | HEART RATE: 73 BPM

## 2024-05-22 DIAGNOSIS — R26.81 GAIT INSTABILITY: Primary | ICD-10-CM

## 2024-05-22 PROCEDURE — 99212 OFFICE O/P EST SF 10 MIN: CPT

## 2024-05-22 PROCEDURE — 1123F ACP DISCUSS/DSCN MKR DOCD: CPT | Performed by: NEUROLOGICAL SURGERY

## 2024-05-22 PROCEDURE — 99212 OFFICE O/P EST SF 10 MIN: CPT | Performed by: NEUROLOGICAL SURGERY

## 2024-05-22 NOTE — PROGRESS NOTES
Patient is here for follow up from a hospital stay for: possible NPH.  She had a 3 day drain trial with minimal relief.     Physical exam  Alert and Oriented X3  PERRLA, EOMI  ARAIZA 5/5  Sensation intact to LT and PP  Reflexes are 2+ and symmetric    A/P: patient is here for follow up for: gait instability and memory problems.  No improvement with drain trial. No shunt is recommended and I will refer her to neurology.     Maximus Maldonado MD

## 2024-08-07 ENCOUNTER — OFFICE VISIT (OUTPATIENT)
Dept: NEUROLOGY | Age: 69
End: 2024-08-07
Payer: COMMERCIAL

## 2024-08-07 VITALS
BODY MASS INDEX: 25.74 KG/M2 | DIASTOLIC BLOOD PRESSURE: 72 MMHG | HEART RATE: 89 BPM | HEIGHT: 67 IN | OXYGEN SATURATION: 97 % | SYSTOLIC BLOOD PRESSURE: 108 MMHG | WEIGHT: 164 LBS

## 2024-08-07 DIAGNOSIS — G91.2 NPH (NORMAL PRESSURE HYDROCEPHALUS) (HCC): ICD-10-CM

## 2024-08-07 DIAGNOSIS — R41.841 COGNITIVE COMMUNICATION DEFICIT: Primary | ICD-10-CM

## 2024-08-07 DIAGNOSIS — R26.89 ABNORMALITY OF GAIT DUE TO IMPAIRMENT OF BALANCE: ICD-10-CM

## 2024-08-07 PROCEDURE — 1123F ACP DISCUSS/DSCN MKR DOCD: CPT | Performed by: NURSE PRACTITIONER

## 2024-08-07 PROCEDURE — 99204 OFFICE O/P NEW MOD 45 MIN: CPT | Performed by: NURSE PRACTITIONER

## 2024-08-07 NOTE — PROGRESS NOTES
[facility]  NEUROLOGY  Abisai Ortega Jr., M.D., F.A.C.P.  Oneil Combs, DNP, APRN, ACNS-BC  Nilesh Valadez, MSN, APRN-FNP-C  Rosibel Franco, MSPAS, PA-C  Idalia Luna, MSN, APRN-FNP-C  April Quiros, MSN, APRN-FNP-C   Peggy Petit, MSN, APRN-FNP-C  [facility] [place] [address]  Phone: [phone]   [place] [address]  Phone: [phone]  Fax: [phone]       Emili Esquivel is a 68 y.o. right handed female     Patient to neurology clinic today for evaluation of NPH and memory concerns    Patient presents alone and is deemed a decent historian    Past Medical History:     Past Medical History:   Diagnosis Date    Arthritis     Cancer (HCC)     skin     Hyperlipemia     NPH (normal pressure hydrocephalus) (HCC)        Past Surgical History:       Past Surgical History:   Procedure Laterality Date    APPENDECTOMY      CARPAL TUNNEL RELEASE Bilateral     COLONOSCOPY      HYSTERECTOMY (CERVIX STATUS UNKNOWN)      LUMBAR DRAIN IMPLANTATION N/A 5/7/2024    Placement of LUMBAR DRAIN [place] Maximus Maldonado [place] [facility]       Allergies:       Patient has no known allergies.    Medications:     Prior to Admission medications    Medication Sig Start Date End Date Taking? Authorizing Provider   rosuvastatin (CRESTOR) 10 MG tablet Take 1 tablet by mouth daily   Yes Micaela Riddle MD   meclizine (ANTIVERT) 25 MG tablet Take 1 tablet by mouth 3 times daily as needed for Dizziness  Patient not taking: Reported on 8/7/2024 3/13/24   Micaela Riddle MD       Social History:        reports that she has never smoked. She has never used smokeless tobacco. She reports that she does not drink alcohol and does not use drugs.  Patient is retired.  Patient is  and has no children    Review of Systems:     + Gait instability  No chest pain or palpitations  No SOB  No vertigo, lightheadedness or loss of consciousness  No falls, tripping or stumbling  No

## 2025-01-11 ENCOUNTER — APPOINTMENT (OUTPATIENT)
Dept: GENERAL RADIOLOGY | Age: 70
End: 2025-01-11
Payer: COMMERCIAL

## 2025-01-11 ENCOUNTER — HOSPITAL ENCOUNTER (EMERGENCY)
Age: 70
Discharge: HOME | End: 2025-01-12
Attending: STUDENT IN AN ORGANIZED HEALTH CARE EDUCATION/TRAINING PROGRAM
Payer: COMMERCIAL
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
## 2025-01-11 LAB
ALBUMIN SERPL-MCNC: 4 G/DL (ref 3.5–5.2)
ALP SERPL-CCNC: 71 U/L (ref 35–104)
ALT SERPL-CCNC: 13 U/L (ref 0–32)
ANION GAP SERPL CALCULATED.3IONS-SCNC: 14 MMOL/L (ref 7–16)
AST SERPL-CCNC: 20 U/L (ref 0–31)
BASOPHILS # BLD: 0.03 K/UL (ref 0–0.2)
BASOPHILS NFR BLD: 0 % (ref 0–2)
BILIRUB SERPL-MCNC: 1.8 MG/DL (ref 0–1.2)
BUN SERPL-MCNC: 17 MG/DL (ref 6–23)
CALCIUM SERPL-MCNC: 9.5 MG/DL (ref 8.6–10.2)
CHLORIDE SERPL-SCNC: 101 MMOL/L (ref 98–107)
CO2 SERPL-SCNC: 22 MMOL/L (ref 22–29)
CREAT SERPL-MCNC: 0.6 MG/DL (ref 0.5–1)
EOSINOPHIL # BLD: 0.03 K/UL (ref 0.05–0.5)
EOSINOPHILS RELATIVE PERCENT: 0 % (ref 0–6)
ERYTHROCYTE [DISTWIDTH] IN BLOOD BY AUTOMATED COUNT: 13.6 % (ref 11.5–15)
GFR, ESTIMATED: >90 ML/MIN/1.73M2
GLUCOSE SERPL-MCNC: 112 MG/DL (ref 74–99)
HCT VFR BLD AUTO: 41.9 % (ref 34–48)
HGB BLD-MCNC: 14.3 G/DL (ref 11.5–15.5)
IMM GRANULOCYTES # BLD AUTO: 0.04 K/UL (ref 0–0.58)
IMM GRANULOCYTES NFR BLD: 0 % (ref 0–5)
LYMPHOCYTES NFR BLD: 1.11 K/UL (ref 1.5–4)
LYMPHOCYTES RELATIVE PERCENT: 10 % (ref 20–42)
MCH RBC QN AUTO: 30.2 PG (ref 26–35)
MCHC RBC AUTO-ENTMCNC: 34.1 G/DL (ref 32–34.5)
MCV RBC AUTO: 88.6 FL (ref 80–99.9)
MONOCYTES NFR BLD: 0.37 K/UL (ref 0.1–0.95)
MONOCYTES NFR BLD: 3 % (ref 2–12)
NEUTROPHILS NFR BLD: 86 % (ref 43–80)
NEUTS SEG NFR BLD: 9.54 K/UL (ref 1.8–7.3)
PLATELET # BLD AUTO: 220 K/UL (ref 130–450)
PMV BLD AUTO: 10.3 FL (ref 7–12)
POTASSIUM SERPL-SCNC: 3.3 MMOL/L (ref 3.5–5)
PROT SERPL-MCNC: 7.3 G/DL (ref 6.4–8.3)
RBC # BLD AUTO: 4.73 M/UL (ref 3.5–5.5)
SODIUM SERPL-SCNC: 137 MMOL/L (ref 132–146)
WBC OTHER # BLD: 11.1 K/UL (ref 4.5–11.5)

## 2025-01-11 PROCEDURE — 73562 X-RAY EXAM OF KNEE 3: CPT

## 2025-01-11 PROCEDURE — 85025 COMPLETE CBC W/AUTO DIFF WBC: CPT

## 2025-01-11 PROCEDURE — 80053 COMPREHEN METABOLIC PANEL: CPT

## 2025-01-11 PROCEDURE — 72170 X-RAY EXAM OF PELVIS: CPT

## 2025-01-11 PROCEDURE — 99284 EMERGENCY DEPT VISIT MOD MDM: CPT

## 2025-01-11 PROCEDURE — 82550 ASSAY OF CK (CPK): CPT

## 2025-01-11 ASSESSMENT — PAIN - FUNCTIONAL ASSESSMENT: PAIN_FUNCTIONAL_ASSESSMENT: NONE - DENIES PAIN

## 2025-01-11 ASSESSMENT — LIFESTYLE VARIABLES
HOW OFTEN DO YOU HAVE A DRINK CONTAINING ALCOHOL: NEVER
HOW MANY STANDARD DRINKS CONTAINING ALCOHOL DO YOU HAVE ON A TYPICAL DAY: PATIENT DOES NOT DRINK

## 2025-01-12 VITALS
OXYGEN SATURATION: 96 % | HEIGHT: 67 IN | BODY MASS INDEX: 23.54 KG/M2 | SYSTOLIC BLOOD PRESSURE: 109 MMHG | DIASTOLIC BLOOD PRESSURE: 73 MMHG | WEIGHT: 150 LBS | TEMPERATURE: 97 F | RESPIRATION RATE: 14 BRPM | HEART RATE: 67 BPM

## 2025-01-12 LAB — CK SERPL-CCNC: 95 U/L (ref 20–180)

## 2025-01-12 PROCEDURE — 6370000000 HC RX 637 (ALT 250 FOR IP)

## 2025-01-12 RX ORDER — POTASSIUM CHLORIDE 1500 MG/1
40 TABLET, EXTENDED RELEASE ORAL ONCE
Status: COMPLETED | OUTPATIENT
Start: 2025-01-12 | End: 2025-01-12

## 2025-01-12 RX ADMIN — POTASSIUM CHLORIDE 40 MEQ: 1500 TABLET, EXTENDED RELEASE ORAL at 01:01

## 2025-01-12 NOTE — ED NOTES
Pt ambulated in hallway. Pt reports of feeling unsteady and off. Pt did not look unsteady but insisted on walking with assistance the whole time, and refused to release nurse's hand. Patient feels that it is inappropriate to be discharged late at night. She was not SOB, or labored during ambulation.

## 2025-01-12 NOTE — ED PROVIDER NOTES
[facility] EMERGENCY DEPARTMENT  EMERGENCY DEPARTMENT ENCOUNTER        Pt Name: Emili Esquivel  MRN: [id number]  Birthdate 1955  Date of evaluation: 1/11/2025  Provider: Everton Lorenzo DO  PCP: Pa Burdick MD  Note Started: 9:57 PM EST 1/11/25    CHIEF COMPLAINT       Chief Complaint   Patient presents with    Fall     Found lying for 2-4 hours in snow after fall on ice, -thinners. ;denies any complaint except cold    Cold Exposure       HISTORY OF PRESENT ILLNESS: 1 or more Elements   History From: patient    Limitations to history : None    Emili Esquivel is a 69 y.o. female who presents after fall.  Patient was found down outside of her house in the snow.  She was there for approximately 2 to 4 hours.  States that she slipped on the ice as a belief on the ground was unable to get herself back up. she is not on any thinners.  She denies hitting her head.  She has no complaints of pain.    Patient denies fever, chills, headache, shortness of breath, chest pain, abdominal pain, nausea, vomiting, diarrhea, lightheadedness, dysuria, hematuria, hematochezia, and melena.    Nursing Notes were all reviewed and agreed with or any disagreements were addressed in the HPI.        REVIEW OF SYSTEMS :           Positives and Pertinent negatives as per HPI.     SURGICAL HISTORY     Past Surgical History:   Procedure Laterality Date    APPENDECTOMY      CARPAL TUNNEL RELEASE Bilateral     COLONOSCOPY      HYSTERECTOMY (CERVIX STATUS UNKNOWN)      LUMBAR DRAIN IMPLANTATION N/A 5/7/2024    Placement of LUMBAR DRAIN performed by Maximus Maldonado MD at [facility] [place]       CURRENTMEDICATIONS       Previous Medications    MECLIZINE (ANTIVERT) 25 MG TABLET    Take 1 tablet by mouth 3 times daily as needed for Dizziness    ROSUVASTATIN (CRESTOR) 10 MG TABLET    Take 1 tablet by mouth daily       ALLERGIES     Patient has no known allergies.    FAMILYHISTORY       Family History   Problem Relation

## 2025-01-16 ENCOUNTER — APPOINTMENT (OUTPATIENT)
Dept: CT IMAGING | Age: 70
End: 2025-01-16
Payer: COMMERCIAL

## 2025-01-16 ENCOUNTER — HOSPITAL ENCOUNTER (EMERGENCY)
Age: 70
Discharge: HOME | End: 2025-01-16
Attending: EMERGENCY MEDICINE
Payer: COMMERCIAL

## 2025-01-16 ENCOUNTER — APPOINTMENT (OUTPATIENT)
Dept: GENERAL RADIOLOGY | Age: 70
End: 2025-01-16
Payer: COMMERCIAL

## 2025-01-16 VITALS
DIASTOLIC BLOOD PRESSURE: 83 MMHG | HEART RATE: 100 BPM | TEMPERATURE: 97.8 F | RESPIRATION RATE: 18 BRPM | SYSTOLIC BLOOD PRESSURE: 131 MMHG | OXYGEN SATURATION: 97 % | WEIGHT: 150 LBS | HEIGHT: 67 IN | BODY MASS INDEX: 23.54 KG/M2

## 2025-01-16 DIAGNOSIS — R32 URINARY INCONTINENCE, UNSPECIFIED TYPE: ICD-10-CM

## 2025-01-16 DIAGNOSIS — F40.298 FEAR OF FALLING: Primary | ICD-10-CM

## 2025-01-16 DIAGNOSIS — R53.1 GENERAL WEAKNESS: ICD-10-CM

## 2025-01-16 LAB
ALBUMIN SERPL-MCNC: 4 G/DL (ref 3.5–5.2)
ALP SERPL-CCNC: 70 U/L (ref 35–104)
ALT SERPL-CCNC: 20 U/L (ref 0–32)
ANION GAP SERPL CALCULATED.3IONS-SCNC: 14 MMOL/L (ref 7–16)
AST SERPL-CCNC: 37 U/L (ref 0–31)
BASOPHILS # BLD: 0.04 K/UL (ref 0–0.2)
BASOPHILS NFR BLD: 0 % (ref 0–2)
BILIRUB SERPL-MCNC: 1 MG/DL (ref 0–1.2)
BUN SERPL-MCNC: 20 MG/DL (ref 6–23)
CALCIUM SERPL-MCNC: 9.5 MG/DL (ref 8.6–10.2)
CHLORIDE SERPL-SCNC: 101 MMOL/L (ref 98–107)
CO2 SERPL-SCNC: 25 MMOL/L (ref 22–29)
CREAT SERPL-MCNC: 0.6 MG/DL (ref 0.5–1)
EOSINOPHIL # BLD: 0.03 K/UL (ref 0.05–0.5)
EOSINOPHILS RELATIVE PERCENT: 0 % (ref 0–6)
ERYTHROCYTE [DISTWIDTH] IN BLOOD BY AUTOMATED COUNT: 13.8 % (ref 11.5–15)
GFR, ESTIMATED: >90 ML/MIN/1.73M2
GLUCOSE SERPL-MCNC: 94 MG/DL (ref 74–99)
HCT VFR BLD AUTO: 43.7 % (ref 34–48)
HGB BLD-MCNC: 14.2 G/DL (ref 11.5–15.5)
IMM GRANULOCYTES # BLD AUTO: 0.04 K/UL (ref 0–0.58)
IMM GRANULOCYTES NFR BLD: 0 % (ref 0–5)
LYMPHOCYTES NFR BLD: 1.76 K/UL (ref 1.5–4)
LYMPHOCYTES RELATIVE PERCENT: 18 % (ref 20–42)
MCH RBC QN AUTO: 29.9 PG (ref 26–35)
MCHC RBC AUTO-ENTMCNC: 32.5 G/DL (ref 32–34.5)
MCV RBC AUTO: 92 FL (ref 80–99.9)
MONOCYTES NFR BLD: 0.48 K/UL (ref 0.1–0.95)
MONOCYTES NFR BLD: 5 % (ref 2–12)
NEUTROPHILS NFR BLD: 76 % (ref 43–80)
NEUTS SEG NFR BLD: 7.26 K/UL (ref 1.8–7.3)
PLATELET # BLD AUTO: 211 K/UL (ref 130–450)
PMV BLD AUTO: 10.3 FL (ref 7–12)
POTASSIUM SERPL-SCNC: 4.5 MMOL/L (ref 3.5–5)
PROT SERPL-MCNC: 7.6 G/DL (ref 6.4–8.3)
RBC # BLD AUTO: 4.75 M/UL (ref 3.5–5.5)
SODIUM SERPL-SCNC: 140 MMOL/L (ref 132–146)
T4 SERPL-MCNC: 9.1 UG/DL (ref 4.5–11.7)
TSH SERPL DL<=0.05 MIU/L-ACNC: 3.16 UIU/ML (ref 0.27–4.2)
WBC OTHER # BLD: 9.6 K/UL (ref 4.5–11.5)

## 2025-01-16 PROCEDURE — 99284 EMERGENCY DEPT VISIT MOD MDM: CPT

## 2025-01-16 PROCEDURE — 70450 CT HEAD/BRAIN W/O DYE: CPT

## 2025-01-16 PROCEDURE — 71045 X-RAY EXAM CHEST 1 VIEW: CPT

## 2025-01-16 PROCEDURE — 84443 ASSAY THYROID STIM HORMONE: CPT

## 2025-01-16 PROCEDURE — 80053 COMPREHEN METABOLIC PANEL: CPT

## 2025-01-16 PROCEDURE — 84436 ASSAY OF TOTAL THYROXINE: CPT

## 2025-01-16 PROCEDURE — 85025 COMPLETE CBC W/AUTO DIFF WBC: CPT

## 2025-01-16 ASSESSMENT — PAIN - FUNCTIONAL ASSESSMENT: PAIN_FUNCTIONAL_ASSESSMENT: NONE - DENIES PAIN

## 2025-01-16 ASSESSMENT — LIFESTYLE VARIABLES
HOW OFTEN DO YOU HAVE A DRINK CONTAINING ALCOHOL: MONTHLY OR LESS
HOW MANY STANDARD DRINKS CONTAINING ALCOHOL DO YOU HAVE ON A TYPICAL DAY: PATIENT DOES NOT DRINK

## 2025-01-16 NOTE — ED TRIAGE NOTES
PT brought in by ems from home. Welfare check to residence found the pt to be in unlivable situation, pt is urinary ioncot. Pt  is also In [facility]  4th floor as pt.

## 2025-01-16 NOTE — CARE COORDINATION
Social Work/Transition of Care:    SW met with patient introduced self and role.  Patient reports her  called for a welfare check today because she was not answering the phone.  Patient reports her spouse is currently in the hospital and he is to be discharged home today.  Patient stated when the EMS came in along with the 's office they felt that she needed to come to the ER to be evaluated.  Patient reports that she and her  do fine when they are together and they have no issues.  Patient reports minimal family and friends able to assist them with their looking at getting someone to help them at home.  Patient stated if it would have been Eva that came today they are familiar with her and her  and they would have taken her dog's way, patient reports she knows she will get her dogs back.  Patient reports no complaints and would like to return home with her spouse.  TREMAYNE updated ED PCP and spoke with CM supervisor, plan is for patient to be medically cleared and will be transported to her 's room in order to be discharged home together with a taxi.      Electronically signed by CHAYA jaimes on 1/16/2025 at 2:52 PM

## 2025-01-16 NOTE — DISCHARGE INSTR - COC
Manager/ signature: {Esignature:[id number]}    PHYSICIAN SECTION    Prognosis: {Prognosis:3146427282}    Condition at Discharge: { Patient Condition:059642074}    Rehab Potential (if transferring to Rehab): {Prognosis:9324624715}    Recommended Labs or Other Treatments After Discharge: ***    Physician Certification: I certify the above information and transfer of Emili Esquivel  is necessary for the continuing treatment of the diagnosis listed and that she requires {Admit to Appropriate Level of Care:35352} for {GREATER/LESS:264539892} 30 days.     Update Admission H&P: {CHP DME Changes in HandP:800074710}    PHYSICIAN SIGNATURE:  {Esignature:425057419}

## 2025-01-16 NOTE — ED PROVIDER NOTES
Shared LUZ MARINA-ED Attending Visit.  CC: No               [facility] EMERGENCY DEPARTMENT  ED  Encounter Note  Admit Date/RoomTime: 2025  1:42 PM  ED Room:   NAME: Emili Esquivel  : 1955  MRN: [id number]  PCP: Pa Burdick MD    CHIEF COMPLAINT     No chief complaint on file.    HISTORY OF PRESENT ILLNESS        Emili Esquivel is a 69 y.o. female who presents to the ED by ambulance for \"welfare check\", beginning an unknown time ago. The complaint has been persistent and are moderate in severity.  The patient is here today because of unsafe living conditions within the home.  EMS reports that they received a call for a welfare check on this patient.  She was found apparently in a home that was deemed \"unlivable\" there was no heat within the home.  The patient had been incontinent of urine at the house which apparently has been chronic.  The patient herself tells me she is having trouble taking care of herself.  She lives with her  who was admitted to the hospital here a week ago for atrial fibrillation.  She states that he is out in their home because they have a wood-burning stove in the basement and she has not been able to go down to fill it with wood.  States that she is worried about getting back up the steps.  She fell in their yard a few days ago and had to be rescued and seen by EMS at that time.    I did review those records.  The patient states she was worried that she might fall agai and states that she called the ambulance herself today.   Denies any recurrent falls.  Denies pain.  Denies chest pain, shortness of breath, vomiting, fever.  She knows that it is January but guesses the wrong date.  She does get the right ear but does not know who the president is currently or who is coming as president within the next few weeks.   Reports the her and her  do not have any children.    I did speak with the patient's  who is upstairs in room 441

## (undated) DEVICE — GOWN,SIRUS,FABRNF,L,20/CS: Brand: MEDLINE

## (undated) DEVICE — LABEL MED 4 IN SURG PANEL W/ PEN STRL

## (undated) DEVICE — PACK,LAPAROTOMY,NO GOWNS: Brand: MEDLINE

## (undated) DEVICE — THE LUMBAR CATHETER ACCESSORY KIT (LCAK) IS DESIGNED FOR CEREBROSPINAL FLUID DRAINAGE, AND CONSISTS OF: A 80 CM RADIOPAQUE LUMBAR CATHETER (F5) WITH GRAPHITE BASED LENGTH MARKS AT 2 CM INTERVALS (FIRST MARK AT 10 CM FROM THE CLOSED TIP); A GUIDEWIRE IN DISPENSER; A LUER-LOCK CONNECTOR; A BUTTERFLY SUTURE CLAMP; A 14-GA, THIN-WALL TUOHY NEEDLE WITH OBTURATOR.: Brand: LUMBAR CATHETER ACCESSORY KIT

## (undated) DEVICE — SYRINGE MED 20ML STD CLR PLAS LUERLOCK TIP N CTRL DISP

## (undated) DEVICE — MARKER,SKIN,WI/RULER AND LABELS: Brand: MEDLINE

## (undated) DEVICE — GAUZE,SPONGE,AVANT,4"X4",4PLY,STRL,10/TR: Brand: MEDLINE

## (undated) DEVICE — DRAPE,REIN 53X77,STERILE: Brand: MEDLINE

## (undated) DEVICE — TRAP,MUCUS SPECIMEN,40CC: Brand: MEDLINE

## (undated) DEVICE — 3M™ IOBAN™ 2 ANTIMICROBIAL INCISE DRAPE 6650EZ: Brand: IOBAN™ 2

## (undated) DEVICE — GLOVE SURG SZ 65 THK91MIL LTX FREE SYN POLYISOPRENE

## (undated) DEVICE — INCISE SURGICAL DRAPE: Brand: OPSITE INCISE 28X30CM CTN 10

## (undated) DEVICE — GLOVE ORANGE PI 8   MSG9080

## (undated) DEVICE — TOWEL,OR,DSP,ST,BLUE,STD,6/PK,12PK/CS: Brand: MEDLINE

## (undated) DEVICE — VENTRICULAR DRAINAGE SYSTEM

## (undated) DEVICE — GOWN,SIRUS,FABRNF,XL,20/CS: Brand: MEDLINE

## (undated) DEVICE — COUNTER NDL 30 COUNT DBL MAG

## (undated) DEVICE — GLOVE SURG 8.5 PF POLYMER WHT STRL SIGN LTX ESSENTIAL LTX

## (undated) DEVICE — WIPES SKIN CLOTH READYPREP 9 X 10.5 IN 2% CHLORHEX GLUCONATE CHG PREOP

## (undated) DEVICE — SOLUTION SURG PREP 26 CC PURPREP

## (undated) DEVICE — GAUZE,SPONGE,4"X4",16PLY,XRAY,STRL,LF: Brand: MEDLINE

## (undated) DEVICE — HYPODERMIC SAFETY NEEDLE: Brand: MAGELLAN